# Patient Record
Sex: MALE | Race: WHITE | NOT HISPANIC OR LATINO | Employment: OTHER | URBAN - METROPOLITAN AREA
[De-identification: names, ages, dates, MRNs, and addresses within clinical notes are randomized per-mention and may not be internally consistent; named-entity substitution may affect disease eponyms.]

---

## 2019-11-11 ENCOUNTER — OFFICE VISIT (OUTPATIENT)
Dept: URGENT CARE | Facility: CLINIC | Age: 69
End: 2019-11-11
Payer: COMMERCIAL

## 2019-11-11 VITALS
WEIGHT: 165 LBS | HEART RATE: 68 BPM | BODY MASS INDEX: 22.35 KG/M2 | TEMPERATURE: 98.4 F | OXYGEN SATURATION: 99 % | RESPIRATION RATE: 20 BRPM | SYSTOLIC BLOOD PRESSURE: 136 MMHG | DIASTOLIC BLOOD PRESSURE: 75 MMHG | HEIGHT: 72 IN

## 2019-11-11 DIAGNOSIS — S01.81XA FACIAL LACERATION, INITIAL ENCOUNTER: Primary | ICD-10-CM

## 2019-11-11 PROCEDURE — 12011 RPR F/E/E/N/L/M 2.5 CM/<: CPT | Performed by: PHYSICIAN ASSISTANT

## 2019-11-11 PROCEDURE — 99213 OFFICE O/P EST LOW 20 MIN: CPT | Performed by: PHYSICIAN ASSISTANT

## 2019-11-11 PROCEDURE — S9083 URGENT CARE CENTER GLOBAL: HCPCS | Performed by: PHYSICIAN ASSISTANT

## 2019-11-11 RX ORDER — DIPHENOXYLATE HYDROCHLORIDE AND ATROPINE SULFATE 2.5; .025 MG/1; MG/1
1 TABLET ORAL DAILY
COMMUNITY

## 2019-11-11 RX ORDER — BACITRACIN, NEOMYCIN, POLYMYXIN B 400; 3.5; 5 [USP'U]/G; MG/G; [USP'U]/G
1 OINTMENT TOPICAL ONCE
Status: COMPLETED | OUTPATIENT
Start: 2019-11-11 | End: 2019-11-11

## 2019-11-11 RX ORDER — LIDOCAINE HYDROCHLORIDE 10 MG/ML
5 INJECTION, SOLUTION EPIDURAL; INFILTRATION; INTRACAUDAL; PERINEURAL ONCE
Status: COMPLETED | OUTPATIENT
Start: 2019-11-11 | End: 2019-11-11

## 2019-11-11 RX ADMIN — LIDOCAINE HYDROCHLORIDE 5 ML: 10 INJECTION, SOLUTION EPIDURAL; INFILTRATION; INTRACAUDAL; PERINEURAL at 10:52

## 2019-11-11 RX ADMIN — BACITRACIN, NEOMYCIN, POLYMYXIN B 1 SMALL APPLICATION: 400; 3.5; 5 OINTMENT TOPICAL at 11:23

## 2019-11-11 NOTE — PROGRESS NOTES
[facility]        NAME: Hope Mosher is a 68 y o  male  : 1950    MRN: [id number]  DATE: 2019  TIME: 11:45 AM    Assessment and Plan   Facial laceration, initial encounter Elis Beltrán  1  Facial laceration, initial encounter  lidocaine (PF) (XYLOCAINE-MPF) 1 % injection 5 mL    neomycin-bacitracin-polymyxin b (NEOSPORIN) ointment 1 small application     Laceration repair  Date/Time: 2019 11:43 AM  Performed by: Reymundo Baltazar PA-C  Authorized by: Reymundo Baltazar PA-C   Consent: Verbal consent obtained  Risks and benefits: risks, benefits and alternatives were discussed  Consent given by: patient  Patient understanding: patient states understanding of the procedure being performed  Patient identity confirmed: verbally with patient  Body area: head/neck  Location details: left eyebrow  Laceration length: 2 5 cm  Nerve involvement: none  Vascular damage: no  Anesthesia: local infiltration    Anesthesia:  Local Anesthetic: lidocaine 1% without epinephrine  Anesthetic total: 2 mL    Wound Dehiscence:  Superficial Wound Dehiscence: simple closure      Procedure Details:  Preparation: Patient was prepped and draped in the usual sterile fashion  Irrigation solution: saline  Irrigation method: syringe  Amount of cleaning: standard  Debridement: none  Skin closure: 5-0 nylon  Number of sutures: 6  Technique: simple  Approximation: close  Approximation difficulty: simple  Dressing: antibiotic ointment and 4x4 sterile gauze  Patient tolerance: Patient tolerated the procedure well with no immediate complications       Patient had a witnessed fall due to a missed step  Normal neurologic exam   Patient does not take blood thinners  No evidence of facial fracture on exam   Laceration repaired  Patient and wife instructed to go to emergency department for headache, dizziness, eye pain or vision change  Patient instructed to take Tylenol and apply ice pack today      Patient Instructions   Patient Instructions   Return in 5 - 7 days for suture removal   Keep covered for 24 hours then you may gently wash and pat dry  Apply antibiotic ointment 2 times a day  If you develop redness, swelling, drainage you need to be evaluated again for infection  If you develop headache, dizziness, excess sleepiness, eye pain or vision change go to ER for further evaluation         Proceed to  ER if symptoms worsen  Chief Complaint     Chief Complaint   Patient presents with    Head Laceration     Patient tripped over the curb, around 9:00AM  Per Patient - currently is not experiencing any signs of concussion         History of Present Illness       Patient is a 78-year-old male who presents for evaluation of laceration above the left eye which occurred around 9:00 a m  This morning  He states that he just did not lift his foot up high enough and tripped on the curb falling forward  There was no loss of consciousness  Patient is not on any blood thinning medications  He denies any headache, dizziness, vision change  He reports some mild discomfort in the area of the laceration  Review of Systems   Review of Systems   Constitutional: Negative  Eyes: Negative  Musculoskeletal: Negative  Skin: Positive for wound  Allergic/Immunologic: Negative  Neurological: Negative  Current Medications       Current Outpatient Medications:     multivitamin (THERAGRAN) TABS, Take 1 tablet by mouth daily, Disp: , Rfl:   No current facility-administered medications for this visit  Current Allergies     Allergies as of 11/11/2019    (No Known Allergies)            The following portions of the patient's history were reviewed and updated as appropriate: allergies, current medications, past family history, past medical history, past social history, past surgical history and problem list      History reviewed  No pertinent past medical history      Past Surgical History:   Procedure Laterality Date    APPENDECTOMY  1956    TONSILECTOMY AND ADNOIDECTOMY  1953    TOTAL SHOULDER REPLACEMENT Bilateral L - 2012; R - 2016       History reviewed  No pertinent family history  Medications have been verified  Objective   /75   Pulse 68   Temp 98 4 °F (36 9 °C) (Tympanic)   Resp 20   Ht 6' (1 829 m)   Wt 74 8 kg (165 lb)   SpO2 99%   BMI 22 38 kg/m²        Physical Exam     Physical Exam   Constitutional: He is oriented to person, place, and time  He appears well-developed  No distress  HENT:   Head: Head is with contusion and with laceration  Head is without raccoon's eyes  Nose: Nose normal    Eyes: Pupils are equal, round, and reactive to light  Conjunctivae and EOM are normal        2 5 cm laceration above left eye  Bleeding controlled  No Pain or double vision with EOM  Some ecchymosis noted over inferior lateral aspect of maxilla but no crepitus, deformity or significant pain  Neck: Normal range of motion and full passive range of motion without pain  No spinous process tenderness and no muscular tenderness present  Normal range of motion present  Neurological: He is alert and oriented to person, place, and time  No cranial nerve deficit or sensory deficit  Skin: Skin is warm  Ecchymosis and laceration noted  Vitals reviewed

## 2019-11-11 NOTE — PATIENT INSTRUCTIONS
Return in 5 - 7 days for suture removal   Keep covered for 24 hours then you may gently wash and pat dry  Apply antibiotic ointment 2 times a day  If you develop redness, swelling, drainage you need to be evaluated again for infection    If you develop headache, dizziness, excess sleepiness, eye pain or vision change go to ER for further evaluation

## 2019-11-16 ENCOUNTER — OFFICE VISIT (OUTPATIENT)
Dept: URGENT CARE | Facility: CLINIC | Age: 69
End: 2019-11-16
Payer: COMMERCIAL

## 2019-11-16 VITALS
SYSTOLIC BLOOD PRESSURE: 111 MMHG | TEMPERATURE: 97.7 F | RESPIRATION RATE: 16 BRPM | DIASTOLIC BLOOD PRESSURE: 58 MMHG | HEART RATE: 70 BPM | WEIGHT: 165 LBS | OXYGEN SATURATION: 98 % | HEIGHT: 72 IN | BODY MASS INDEX: 22.35 KG/M2

## 2019-11-16 DIAGNOSIS — Z48.02 ENCOUNTER FOR REMOVAL OF SUTURES: Primary | ICD-10-CM

## 2019-11-16 PROCEDURE — 99213 OFFICE O/P EST LOW 20 MIN: CPT | Performed by: PHYSICIAN ASSISTANT

## 2019-11-16 PROCEDURE — S9083 URGENT CARE CENTER GLOBAL: HCPCS | Performed by: PHYSICIAN ASSISTANT

## 2019-11-16 NOTE — PROGRESS NOTES
[facility]        NAME: Magui Barlow is a 68 y o  male  : 1950    MRN: [id number]  DATE: 2019  TIME: 10:51 AM    Assessment and Plan   Encounter for removal of sutures [Z48 02]  1  Encounter for removal of sutures        Sutures removed without complication  Patient Instructions     Patient Instructions   Be sure to wear sunscreen in that area  Proceed to  ER if symptoms worsen  Chief Complaint     Chief Complaint   Patient presents with    Suture / Staple Removal     Pt is here today for suture removal  Sutures placed to left eye region Monday  Pt denies discharge or complications  History of Present Illness       Pt presents for suture removal   Sutures were placed 6 days ago and a fall  He denies any pain, swelling, redness, headache, vision change or dizziness  Review of Systems   Review of Systems   Constitutional: Negative for chills and fever  Eyes: Negative  Skin: Positive for wound  Neurological: Negative for dizziness and headaches  Current Medications       Current Outpatient Medications:     multivitamin (THERAGRAN) TABS, Take 1 tablet by mouth daily, Disp: , Rfl:     Current Allergies     Allergies as of 2019    (No Known Allergies)            The following portions of the patient's history were reviewed and updated as appropriate: allergies, current medications, past family history, past medical history, past social history, past surgical history and problem list      History reviewed  No pertinent past medical history  Past Surgical History:   Procedure Laterality Date    ADENOIDECTOMY      APPENDECTOMY      TONSILECTOMY AND ADNOIDECTOMY      TOTAL SHOULDER REPLACEMENT Bilateral L - ; R -        History reviewed  No pertinent family history  Medications have been verified          Objective   /58 (BP Location: Left arm, Patient Position: Sitting)   Pulse 70   Temp 97 7 °F (36 5 °C) (Tympanic)   Resp 16   Ht 6' (1 829 m)   Wt 74 8 kg (165 lb)   SpO2 98%   BMI 22 38 kg/m²        Physical Exam     Physical Exam   Constitutional: No distress  Skin: Skin is warm  Sutures in place above left eye  No evidence of infection  Wound has approximated well  Vitals reviewed

## 2021-03-13 ENCOUNTER — IMMUNIZATIONS (OUTPATIENT)
Dept: FAMILY MEDICINE CLINIC | Facility: HOSPITAL | Age: 71
End: 2021-03-13

## 2021-03-13 DIAGNOSIS — Z23 ENCOUNTER FOR IMMUNIZATION: Primary | ICD-10-CM

## 2021-03-13 PROCEDURE — 91300 SARS-COV-2 / COVID-19 MRNA VACCINE (PFIZER-BIONTECH) 30 MCG: CPT

## 2021-03-13 PROCEDURE — 0001A SARS-COV-2 / COVID-19 MRNA VACCINE (PFIZER-BIONTECH) 30 MCG: CPT

## 2021-04-03 ENCOUNTER — IMMUNIZATIONS (OUTPATIENT)
Dept: FAMILY MEDICINE CLINIC | Facility: HOSPITAL | Age: 71
End: 2021-04-03

## 2021-04-03 DIAGNOSIS — Z23 ENCOUNTER FOR IMMUNIZATION: Primary | ICD-10-CM

## 2021-04-03 PROCEDURE — 0002A SARS-COV-2 / COVID-19 MRNA VACCINE (PFIZER-BIONTECH) 30 MCG: CPT

## 2021-04-03 PROCEDURE — 91300 SARS-COV-2 / COVID-19 MRNA VACCINE (PFIZER-BIONTECH) 30 MCG: CPT

## 2023-02-23 ENCOUNTER — OFFICE VISIT (OUTPATIENT)
Dept: PODIATRY | Facility: CLINIC | Age: 73
End: 2023-02-23

## 2023-02-23 VITALS
BODY MASS INDEX: 23.03 KG/M2 | WEIGHT: 170 LBS | DIASTOLIC BLOOD PRESSURE: 85 MMHG | HEART RATE: 66 BPM | HEIGHT: 72 IN | SYSTOLIC BLOOD PRESSURE: 134 MMHG

## 2023-02-23 DIAGNOSIS — B35.1 ONYCHOMYCOSIS: Primary | ICD-10-CM

## 2023-02-23 DIAGNOSIS — I73.9 PERIPHERAL VASCULAR DISEASE, UNSPECIFIED (HCC): ICD-10-CM

## 2023-02-23 DIAGNOSIS — L84 CORNS AND CALLUS: ICD-10-CM

## 2023-02-23 RX ORDER — TIMOLOL MALEATE 5 MG/ML
SOLUTION/ DROPS OPHTHALMIC EVERY 24 HOURS
COMMUNITY

## 2023-02-23 RX ORDER — METOPROLOL SUCCINATE 25 MG/1
TABLET, EXTENDED RELEASE ORAL
COMMUNITY
Start: 2023-01-24

## 2023-02-25 NOTE — PROGRESS NOTES
PATIENT:  Delmy Srivastava  1950    ASSESSMENT:  1  Onychomycosis        2  Corns and callus        3  Peripheral vascular disease, unspecified ([facility] [address] )              No orders of the defined types were placed in this encounter  PLAN:  The patient was educated in proper foot wear  Also discussed daily foot assessment and proper foot care  The patient will follow up in 9-12 weeks for foot exam and care  Procedures: 52165, 02188  All mycotic toenails were reduced and debrided in length, width, and girth using a nail nipper and dremel  All non-dystrphic nails also trimmed  All hyperkeratotic skin lesion(s) if present were sharply pared with a scalpel / forcep with no bleeding or evidence of ulceration  Patient tolerated procedure(s) well without complications  Procedures     HPI:  Delmy Srivastava is a 74 y  o year old male seen for at risk foot exam and care  The patient complained of thick toenails and painful lesions  The patient has class findings with PVD  The patient denied any acute pedal disorder, redness, acute swelling, or recent injury  The following portions of the patient's history were reviewed and updated as appropriate: allergies, current medications, past family history, past medical history, past social history, past surgical history and problem list     REVIEW OF SYSTEMS:  GENERAL: No fever or chills  HEART: No chest pain, or palpitation  RESPIRATORY:  No acute SOB or cough  GI: No Nausea, vomit or diarrhea  NEUROLOGIC: No syncope or acute weakness    PHYSICAL EXAM:    /85 (BP Location: Left arm, Patient Position: Sitting, Cuff Size: Standard)   Pulse 66   Ht 6' (1 829 m)   Wt 77 1 kg (170 lb)   BMI 23 06 kg/m²     VASCULAR EXAM  Posterior tibial artery  +1 bilateral     Dorsalis pedis artery absent bilateral   The patient has class findings with skin atrophy, lack of digital hair, and nail dystrophy  There is no lower extremity edema bilaterally  Venous stasis skin changes noted No BLE  NEUROLOGIC EXAM  Sensation is intact to light touch  Sensation is intact to 10gm monofilament  No focal neurologic deficit  DERMATOLOGIC EXAM:   No ulcer or cellulitis noted  Texture, Tone and Turgor are diminished? Yes  The patient has dystrophic/hypertrophic toenails with yellow/white discoloration, onycholysis, and subungal debris  Fungal odor and brittle nature noted  Pain with palpation  Periungual erythema noted  Right foot nails severely dystrophic x1 with 0 7 cm ave thickness (hallux)  Left foot nails dystrophic x5 with 0 4 cm ave thickness (1 through 5)  Patient has hyperkeratotic lesions noted:   Right foot at lateral hallux IPJ and medial second PIPJ within first interspace  Left foot at Sub #4 MPJ  No notable suspicious skin lesions  MUSCULOSKELETAL EXAM:   No acute joint pain, edema, or redness  No acute musculoskeletal problem  Patient has deformity including digital contractures  Risk Category/Class Findings: Q8 (B2 ABC, B3)    A1)  Has the patient had a previous amputation of the foot or integral skeletal portion thereof? No  B1)  Does the patient have absent posterior tibial pulse? No  B3)  Does the patient have absent dorsalis pedis? Yes  B2)  Does the patient have three of the following? Yes           1  Hair growth (increased or decreased), 2   Nail changes (thickening) and 3  Pigmentary changes (discoloring)  C)  Does the patient have two of the following and one above?  No

## 2023-06-02 ENCOUNTER — OFFICE VISIT (OUTPATIENT)
Dept: PODIATRY | Facility: CLINIC | Age: 73
End: 2023-06-02

## 2023-06-02 VITALS
WEIGHT: 167 LBS | BODY MASS INDEX: 22.62 KG/M2 | HEIGHT: 72 IN | HEART RATE: 64 BPM | DIASTOLIC BLOOD PRESSURE: 79 MMHG | SYSTOLIC BLOOD PRESSURE: 125 MMHG

## 2023-06-02 DIAGNOSIS — L84 CORNS AND CALLUS: ICD-10-CM

## 2023-06-02 DIAGNOSIS — B35.1 ONYCHOMYCOSIS: Primary | ICD-10-CM

## 2023-06-02 DIAGNOSIS — I73.9 PERIPHERAL VASCULAR DISEASE, UNSPECIFIED (HCC): ICD-10-CM

## 2023-06-02 NOTE — PROGRESS NOTES
PATIENT:  Thomas Grant  1950    ASSESSMENT:  1  Onychomycosis        2  Corns and callus        3  Peripheral vascular disease, unspecified ([facility] [address] )              No orders of the defined types were placed in this encounter  PLAN:  The patient was educated in proper foot wear  Also discussed daily foot assessment and proper foot care  The patient will follow up in 9-12 weeks for foot exam and care  Procedures: 19945, 79049  All mycotic toenails were reduced and debrided in length, width, and girth using a nail nipper and dremel  All non-dystrphic nails also trimmed  All hyperkeratotic skin lesion(s) if present were sharply pared with a scalpel / forcep with no bleeding or evidence of ulceration  Patient tolerated procedure(s) well without complications  Procedures     HPI:  Thomas Grant is a 74 y  o year old male seen for at risk foot exam and care  The patient complained of thick toenails and painful lesions  The patient has class findings with PVD  The patient denied any acute pedal disorder, redness, acute swelling, or recent injury  The following portions of the patient's history were reviewed and updated as appropriate: allergies, current medications, past family history, past medical history, past social history, past surgical history and problem list     REVIEW OF SYSTEMS:  GENERAL: No fever or chills  HEART: No chest pain, or palpitation  RESPIRATORY:  No acute SOB or cough  GI: No Nausea, vomit or diarrhea  NEUROLOGIC: No syncope or acute weakness    PHYSICAL EXAM:    /79   Pulse 64   Ht 6' (1 829 m)   Wt 75 8 kg (167 lb)   BMI 22 65 kg/m²     VASCULAR EXAM  Posterior tibial artery  +1 bilateral     Dorsalis pedis artery absent bilateral   The patient has class findings with skin atrophy, lack of digital hair, and nail dystrophy  There is no lower extremity edema bilaterally  Venous stasis skin changes noted No BLE      NEUROLOGIC EXAM  Sensation is intact to light touch  Sensation is intact to 10gm monofilament  No focal neurologic deficit  DERMATOLOGIC EXAM:   No ulcer or cellulitis noted  Texture, Tone and Turgor are diminished? Yes  The patient has dystrophic/hypertrophic toenails with yellow/white discoloration, onycholysis, and subungal debris  Fungal odor and brittle nature noted  Pain with palpation  Periungual erythema noted  Right foot nails severely dystrophic x1 with 0 7 cm ave thickness (hallux)  Left foot nails dystrophic x5 with 0 4 cm ave thickness (1 through 5)  Patient has hyperkeratotic lesions noted:   Right foot at lateral hallux IPJ and medial second PIPJ within first interspace  Left foot at Sub #4 MPJ  No notable suspicious skin lesions  MUSCULOSKELETAL EXAM:   No acute joint pain, edema, or redness  No acute musculoskeletal problem  Patient has deformity including digital contractures  Risk Category/Class Findings: Q8 (B2 ABC, B3)    A1)  Has the patient had a previous amputation of the foot or integral skeletal portion thereof? No  B1)  Does the patient have absent posterior tibial pulse? No  B3)  Does the patient have absent dorsalis pedis? Yes  B2)  Does the patient have three of the following? Yes           1  Hair growth (increased or decreased), 2   Nail changes (thickening) and 3  Pigmentary changes (discoloring)  C)  Does the patient have two of the following and one above?  No

## 2023-09-12 ENCOUNTER — OFFICE VISIT (OUTPATIENT)
Dept: PODIATRY | Facility: CLINIC | Age: 73
End: 2023-09-12
Payer: COMMERCIAL

## 2023-09-12 VITALS
DIASTOLIC BLOOD PRESSURE: 81 MMHG | BODY MASS INDEX: 21.67 KG/M2 | WEIGHT: 160 LBS | HEIGHT: 72 IN | HEART RATE: 73 BPM | SYSTOLIC BLOOD PRESSURE: 130 MMHG

## 2023-09-12 DIAGNOSIS — L84 CORNS AND CALLUS: ICD-10-CM

## 2023-09-12 DIAGNOSIS — B35.1 ONYCHOMYCOSIS: Primary | ICD-10-CM

## 2023-09-12 DIAGNOSIS — I73.9 PERIPHERAL VASCULAR DISEASE, UNSPECIFIED (HCC): ICD-10-CM

## 2023-09-12 PROCEDURE — 11056 PARNG/CUTG B9 HYPRKR LES 2-4: CPT | Performed by: PODIATRIST

## 2023-09-12 PROCEDURE — 11721 DEBRIDE NAIL 6 OR MORE: CPT | Performed by: PODIATRIST

## 2023-09-12 NOTE — PROGRESS NOTES
PATIENT:  Carolynn Tovar  1950    ASSESSMENT:  1. Onychomycosis        2. Corns and callus        3. Peripheral vascular disease, unspecified ([address])              No orders of the defined types were placed in this encounter. PLAN:  The patient was educated in proper foot wear. Also discussed daily foot assessment and proper foot care. The patient will follow up in 9-12 weeks for foot exam and care. Procedures: 84950, 46349  All mycotic toenails were reduced and debrided in length, width, and girth using a nail nipper and dremel. All non-dystrphic nails also trimmed. All hyperkeratotic skin lesion(s) if present were sharply pared with a scalpel / forcep with no bleeding or evidence of ulceration. Patient tolerated procedure(s) well without complications. Procedures     HPI:  Carolynn Tovar is a 74 y. o.year old male seen for at risk foot exam and care. The patient complained of thick toenails and painful lesions. The patient has class findings with PVD. Overall patient is clinically unchanged from prior visit. The patient denied any acute pedal disorder, redness, acute swelling, or recent injury. The following portions of the patient's history were reviewed and updated as appropriate: allergies, current medications, past family history, past medical history, past social history, past surgical history and problem list.    REVIEW OF SYSTEMS:  GENERAL: No fever or chills  HEART: No chest pain, or palpitation  RESPIRATORY:  No acute SOB or cough  GI: No Nausea, vomit or diarrhea  NEUROLOGIC: No syncope or acute weakness    PHYSICAL EXAM:    /81   Pulse 73   Ht 6' (1.829 m)   Wt 72.6 kg (160 lb)   BMI 21.70 kg/m²     VASCULAR EXAM  Posterior tibial artery  +1 bilateral.    Dorsalis pedis artery absent bilateral.  The patient has class findings with skin atrophy, lack of digital hair, and nail dystrophy. There is no lower extremity edema bilaterally.     Venous stasis skin changes noted No BLE. NEUROLOGIC EXAM  Sensation is intact to light touch. Sensation is intact to 10gm monofilament. No focal neurologic deficit. DERMATOLOGIC EXAM:   No ulcer or cellulitis noted. Texture, Tone and Turgor are diminished? Yes  The patient has dystrophic/hypertrophic toenails with yellow/white discoloration, onycholysis, and subungal debris. Fungal odor and brittle nature noted. Pain with palpation. Periungual erythema noted. Right foot nails severely dystrophic x1 with 0.7 cm ave thickness (hallux)  Left foot nails dystrophic x5 with 0.4 cm ave thickness (1 through 5)  Patient has hyperkeratotic lesions noted:   Right foot at lateral hallux IPJ and medial second PIPJ within first interspace. Left foot at Sub #4 MPJ. No notable suspicious skin lesions. MUSCULOSKELETAL EXAM:   No acute joint pain, edema, or redness. No acute musculoskeletal problem. Patient has deformity including digital contractures. Risk Category/Class Findings: Q8 (B2 ABC, B3)    A1)  Has the patient had a previous amputation of the foot or integral skeletal portion thereof? No  B1)  Does the patient have absent posterior tibial pulse? No  B3)  Does the patient have absent dorsalis pedis? Yes  B2)  Does the patient have three of the following? Yes           1. Hair growth (increased or decreased), 2.  Nail changes (thickening) and 3. Pigmentary changes (discoloring)  C)  Does the patient have two of the following and one above?  No

## 2023-11-09 ENCOUNTER — EVALUATION (OUTPATIENT)
Dept: OCCUPATIONAL THERAPY | Facility: CLINIC | Age: 73
End: 2023-11-09
Payer: COMMERCIAL

## 2023-11-09 DIAGNOSIS — R29.898 WEAKNESS OF LEFT UPPER EXTREMITY: Primary | ICD-10-CM

## 2023-11-09 PROCEDURE — 97165 OT EVAL LOW COMPLEX 30 MIN: CPT | Performed by: OCCUPATIONAL THERAPIST

## 2023-11-09 PROCEDURE — 97110 THERAPEUTIC EXERCISES: CPT | Performed by: OCCUPATIONAL THERAPIST

## 2023-11-09 PROCEDURE — 97112 NEUROMUSCULAR REEDUCATION: CPT | Performed by: OCCUPATIONAL THERAPIST

## 2023-11-09 NOTE — LETTER
2023    Dani Rosales DO  [address] [facility]    Patient: Morelia Peters   YOB: 1950   Date of Visit: 2023     Encounter Diagnosis     ICD-10-CM    1. Weakness of left upper extremity  R29.898           Dear Dr. Liliana Noel: Thank you for your recent referral of Morelia Peters. Please review the attached evaluation summary from Deion's recent visit. Please verify that you agree with the plan of care by signing the attached order. If you have any questions or concerns, please do not hesitate to call. I sincerely appreciate the opportunity to share in the care of one of your patients and hope to have another opportunity to work with you in the near future. Sincerely,    Kamran Solano, OT      Referring Provider:     I certify that I have read the below Plan of Care and certify the need for these services furnished under this plan of treatment while under my care. Dani Rosales DO  [address] [facility] [address]  Via Fax: [phone]        OT Evaluation     Today's date: 2023  Patient name: Morelia Peters  : 1950  MRN: [id number]  Referring provider: Dani Rosales DO  Dx:   Encounter Diagnosis     ICD-10-CM    1. Weakness of left upper extremity  R29.898                      Assessment  Assessment details: David Cristobal presents with L UE weakness since cervical laminectomy. . He has a history of B Shoulder replacements that have had restricted ROM since surgery. He has weakness for all shoulder motions . He has limited fine motor skill which may be from inablilty to stabilize  wrist and elbow . He has weak biceps and wrist ext. He Is unable to use his L for self care, eating , lifting , housework. He is seeing PT as well . He has help from his wife as needed.    Impairments: abnormal muscle firing, abnormal or restricted ROM, activity intolerance, impaired physical strength and lacks appropriate home exercise program  Functional limitations: unable to use L for self care, housework  Symptom irritability: moderateUnderstanding of Dx/Px/POC: good   Prognosis: good    Goals  STG - 1 wk  1-I with HEP  2- improve  5#    LTG- 8 wks  1- improve biceps to 5/5  2- improve  wrist ext to 5/5  3- I ADL - self care , meal prep , housework     Plan  Patient would benefit from: OT eval and skilled occupational therapy  Planned therapy interventions: home exercise program, strengthening, ADL retraining, kinesiology taping, graded activity and functional ROM exercises  Frequency: 1-2x wk. Duration in weeks: 8  Plan of Care beginning date: 2023  Treatment plan discussed with: patient        Subjective Evaluation    History of Present Illness  Date of surgery: 8/15/2023  Mechanism of injury: surgery  Mechanism of injury: Maxine Blackmon had cervical laminectomy on 8/15/23. 2 weeks post surgery he developed L UE weakness. He has had PT  post op. Quality of life: good    Patient Goals  Patient goals for therapy: increased motion, increased strength and independence with ADLs/IADLs  Patient goal: regain use L UE  Pain  Current pain ratin  At best pain ratin  At worst pain ratin  Location: L UE  Quality: dull ache  Exacerbated by: use of L UE.   Progression: improved    Social Support  Steps to enter house: yes  Stairs in house: yes (basemant)   Lives in: multiple-level home  Lives with: spouse and adult children    Employment status: not working  Hand dominance: right    Treatments  Previous treatment: physical therapy        Objective     Neurological Testing     Additional Neurological Details  C/o paresthesias B     Active Range of Motion   Left Shoulder   Flexion: 40 degrees   Abduction: 35 degrees   External rotation 0°: [facility]  Internal rotation 0°: 0 degrees     Left Elbow   Flexion: 60 degrees   Extension: 20 degrees   Forearm supination: WFL  Forearm pronation: WFL    Left Wrist   Normal active range of motion    Scapular Mobility     Additional Scapular Mobility Details  Able to shrug, retract , protract     Strength/Myotome Testing     Left Elbow   Flexion: 2-  Extension: 5    Left Wrist/Hand   Wrist extension: 4  Wrist flexion: 5  Radial deviation: 5  Ulnar deviation: 5     (2nd hand position)     Trial 1: 10    Right Wrist/Hand      (2nd hand position)     Trial 1: 48    Additional Strength Details  2/5 grossly  EPL - 5/5 , E I 4/5  , EDM 4/5     Unable to stabilize wrist during               Precautions:cervical laminectomy c3-6 8/15/23    [email or web address]  Access Code: [email or web address]        Manuals 11/9  IE                                                                HEP 3x day                                                                                                        Ther Ex             PROM L UE 5m            Wrist ext /rd 1#  3x10            Hammer sup Sm  3x10            Dowel shld flex 3x10            Shoulder flex 0 gravity 3x10            Elbow flex 0 gracity 3x10                         UBE 3/3            Ther Activity             Pegs  30                           Gait Training                                       Modalities

## 2023-11-09 NOTE — PROGRESS NOTES
OT Evaluation     Today's date: 2023  Patient name: Yola Fu  : 1950  MRN: [id number]  Referring provider: Shahla Denton DO  Dx:   Encounter Diagnosis     ICD-10-CM    1. Weakness of left upper extremity  R29.898                      Assessment  Assessment details: Chelsea Coronel presents with L UE weakness since cervical laminectomy. . He has a history of B Shoulder replacements that have had restricted ROM since surgery. He has weakness for all shoulder motions . He has limited fine motor skill which may be from inablilty to stabilize  wrist and elbow . He has weak biceps and wrist ext. He Is unable to use his L for self care, eating , lifting , housework. He is seeing PT as well . He has help from his wife as needed. Impairments: abnormal muscle firing, abnormal or restricted ROM, activity intolerance, impaired physical strength and lacks appropriate home exercise program  Functional limitations: unable to use L for self care, housework  Symptom irritability: moderateUnderstanding of Dx/Px/POC: good   Prognosis: good    Goals  STG - 1 wk  1-I with HEP  2- improve  5#    LTG- 8 wks  1- improve biceps to 5/5  2- improve  wrist ext to 5/5  3- I ADL - self care , meal prep , housework     Plan  Patient would benefit from: OT eval and skilled occupational therapy  Planned therapy interventions: home exercise program, strengthening, ADL retraining, kinesiology taping, graded activity and functional ROM exercises  Frequency: 1-2x wk. Duration in weeks: 8  Plan of Care beginning date: 2023  Treatment plan discussed with: patient        Subjective Evaluation    History of Present Illness  Date of surgery: 8/15/2023  Mechanism of injury: surgery  Mechanism of injury: Chelsea Coronel had cervical laminectomy on 8/15/23. 2 weeks post surgery he developed L UE weakness. He has had PT  post op.   Quality of life: good    Patient Goals  Patient goals for therapy: increased motion, increased strength and independence with ADLs/IADLs  Patient goal: regain use L UE  Pain  Current pain ratin  At best pain ratin  At worst pain ratin  Location: L UE  Quality: dull ache  Exacerbated by: use of L UE. Progression: improved    Social Support  Steps to enter house: yes  Stairs in house: yes (basemant)   Lives in: multiple-level home  Lives with: spouse and adult children    Employment status: not working  Hand dominance: right    Treatments  Previous treatment: physical therapy        Objective     Neurological Testing     Additional Neurological Details  C/o paresthesias B     Active Range of Motion   Left Shoulder   Flexion: 40 degrees   Abduction: 35 degrees   External rotation 0°: WFL  Internal rotation 0°: 0 degrees     Left Elbow   Flexion: 60 degrees   Extension: 20 degrees   Forearm supination: WFL  Forearm pronation: WFL    Left Wrist   Normal active range of motion    Scapular Mobility     Additional Scapular Mobility Details  Able to shrug, retract , protract     Strength/Myotome Testing     Left Elbow   Flexion: 2-  Extension: 5    Left Wrist/Hand   Wrist extension: 4  Wrist flexion: 5  Radial deviation: 5  Ulnar deviation: 5     (2nd hand position)     Trial 1: 10    Right Wrist/Hand      (2nd hand position)     Trial 1: 48    Additional Strength Details  2/5 grossly  EPL - 5/5 , E I 4/5  , EDM 4/5     Unable to stabilize wrist during               Precautions:cervical laminectomy c3-6 8/15/23    [email or web address]  Access Code: [email or web address]        Manuals   IE                                                                HEP 3x day                                                                                                        Ther Ex             PROM L UE 5m            Wrist ext /rd 1#  3x10            Hammer sup Sm  3x10            Dowel shld flex 3x10            Shoulder flex 0 gravity 3x10            Elbow flex 0 gracity 3x10                         UBE 3/3 Ther Activity             Pegs  30                           Gait Training                                       Modalities

## 2023-11-15 ENCOUNTER — OFFICE VISIT (OUTPATIENT)
Dept: OCCUPATIONAL THERAPY | Facility: CLINIC | Age: 73
End: 2023-11-15
Payer: COMMERCIAL

## 2023-11-15 DIAGNOSIS — R29.898 WEAKNESS OF LEFT UPPER EXTREMITY: Primary | ICD-10-CM

## 2023-11-15 PROCEDURE — 97110 THERAPEUTIC EXERCISES: CPT | Performed by: OCCUPATIONAL THERAPIST

## 2023-11-15 PROCEDURE — 97112 NEUROMUSCULAR REEDUCATION: CPT | Performed by: OCCUPATIONAL THERAPIST

## 2023-11-15 NOTE — PROGRESS NOTES
Daily Note     Today's date: 11/15/2023  Patient name: Wilmar Robles  : 1950  MRN: [id number]  Referring provider: Georgiana Rico DO  Dx:   Encounter Diagnosis     ICD-10-CM    1. Weakness of left upper extremity  R29.898                      Subjective: I have been trying to the exercises at home      Objective: See treatment diary below      Assessment: Tolerated treatment well. Patient  is compliant with HEP carryover and motivated with tx. Plan: Continue per plan of care. Precautions:cervical laminectomy c3-6 8/15/23    [email or web address]  Access Code: [email or web address]        Manuals   IE 11/15                                                               HEP 3x day                                                                                                        Ther Ex             PROM L UE 5m 5m           Wrist ext /rd 1#  3x10 #1 3x110           Hammer sup Sm  3x10 SM 3x10           Dowel shld flex 3x10 Bar  3  x10           Shoulder flex 0 gravity 3x10 3x10           Elbow flex 0 gracity 3x10 #1 cuff 3x10           gripping  RPW 2x30           UBE 3/3 3/3           Ther Activity             Pegs  30                           Gait Training                                       Modalities

## 2023-11-21 ENCOUNTER — OFFICE VISIT (OUTPATIENT)
Dept: OCCUPATIONAL THERAPY | Facility: CLINIC | Age: 73
End: 2023-11-21
Payer: COMMERCIAL

## 2023-11-21 ENCOUNTER — OFFICE VISIT (OUTPATIENT)
Dept: PODIATRY | Facility: CLINIC | Age: 73
End: 2023-11-21
Payer: COMMERCIAL

## 2023-11-21 VITALS
SYSTOLIC BLOOD PRESSURE: 139 MMHG | HEART RATE: 52 BPM | BODY MASS INDEX: 21.67 KG/M2 | DIASTOLIC BLOOD PRESSURE: 80 MMHG | WEIGHT: 160 LBS | HEIGHT: 72 IN

## 2023-11-21 DIAGNOSIS — L84 CORNS AND CALLUS: ICD-10-CM

## 2023-11-21 DIAGNOSIS — B35.1 ONYCHOMYCOSIS: Primary | ICD-10-CM

## 2023-11-21 DIAGNOSIS — R29.898 WEAKNESS OF LEFT UPPER EXTREMITY: Primary | ICD-10-CM

## 2023-11-21 DIAGNOSIS — I73.9 PERIPHERAL VASCULAR DISEASE, UNSPECIFIED (HCC): ICD-10-CM

## 2023-11-21 PROCEDURE — 11056 PARNG/CUTG B9 HYPRKR LES 2-4: CPT | Performed by: PODIATRIST

## 2023-11-21 PROCEDURE — 11721 DEBRIDE NAIL 6 OR MORE: CPT | Performed by: PODIATRIST

## 2023-11-21 PROCEDURE — 97110 THERAPEUTIC EXERCISES: CPT | Performed by: OCCUPATIONAL THERAPIST

## 2023-11-21 PROCEDURE — 97112 NEUROMUSCULAR REEDUCATION: CPT | Performed by: OCCUPATIONAL THERAPIST

## 2023-11-21 NOTE — PROGRESS NOTES
Daily Note     Today's date: 2023  Patient name: Sandoval Camarillo  : 1950  MRN: [id number]  Referring provider: Yany Nogueira DO  Dx:   Encounter Diagnosis     ICD-10-CM    1. Weakness of left upper extremity  R29.898                      Subjective: I do my exercises      Objective: See treatment diary below      Assessment: Tolerated treatment well. Patient  had fatigue post tx       Plan: Continue per plan of care. Precautions:cervical laminectomy c3-6 8/15/23    [email or web address]  Access Code: [email or web address]        Manuals   IE 11/15 11/21                                                              HEP 3x day                                                                                                        Ther Ex             PROM L UE 5m 5m 2m          Wrist ext /rd 1#  3x10 #1 3x110 2#  3x10          Hammer sup Sm  3x10 SM 3x10 Sm  3x10          Dowel shld flex 3x10 Bar  3  x10           Shoulder flex 0 gravity 3x10 3x10 3x10          Elbow flex 0 gracity 3x10 #1 cuff 3x10 3x10          gripping  RPW 2x30 RPW  2x30          UBE 3/3   5/5          Ther Activity             Pegs  30    30x          Stress load    2m          Webslide row   Green  3x10                                    Modalities

## 2023-11-28 ENCOUNTER — OFFICE VISIT (OUTPATIENT)
Dept: OCCUPATIONAL THERAPY | Facility: CLINIC | Age: 73
End: 2023-11-28
Payer: COMMERCIAL

## 2023-11-28 DIAGNOSIS — R29.898 WEAKNESS OF LEFT UPPER EXTREMITY: Primary | ICD-10-CM

## 2023-11-28 PROCEDURE — 97110 THERAPEUTIC EXERCISES: CPT | Performed by: OCCUPATIONAL THERAPIST

## 2023-11-28 PROCEDURE — 97112 NEUROMUSCULAR REEDUCATION: CPT | Performed by: OCCUPATIONAL THERAPIST

## 2023-12-01 ENCOUNTER — OFFICE VISIT (OUTPATIENT)
Dept: OCCUPATIONAL THERAPY | Facility: CLINIC | Age: 73
End: 2023-12-01
Payer: COMMERCIAL

## 2023-12-01 DIAGNOSIS — R29.898 WEAKNESS OF LEFT UPPER EXTREMITY: Primary | ICD-10-CM

## 2023-12-01 PROCEDURE — 97110 THERAPEUTIC EXERCISES: CPT | Performed by: OCCUPATIONAL THERAPIST

## 2023-12-01 PROCEDURE — 97112 NEUROMUSCULAR REEDUCATION: CPT | Performed by: OCCUPATIONAL THERAPIST

## 2023-12-01 NOTE — PROGRESS NOTES
Daily Note     Today's date: 2023  Patient name: Otoniel Shen  : 1950  MRN: [id number]  Referring provider: Shira Newell DO  Dx:   Encounter Diagnosis     ICD-10-CM    1. Weakness of left upper extremity  R29.898                      Subjective: I am doing ok       Objective: See treatment diary below      Assessment: Tolerated treatment fair. Patient  has continued weakness that limits use of  L for ADL       Plan: Continue per plan of care. Precautions:cervical laminectomy c3-6 8/15/23    [email or web address]  Access Code: [facility]        Manuals   IE 11/15 11/21 11/28 11/30                                                            HEP 3x day   Tband                                                                                                     Ther Ex             PROM L UE 5m 5m 2m          Wrist ext /rd 1#  3x10 #1 3x110 2#  3x10 2#  3x10 2#  3x10        Hammer sup Sm  3x10 SM 3x10 Sm  3x10 Sm  3x10 Sm  3x10        Dowel shld flex 3x10 Bar  3  x10           Shoulder flex 0 gravity 3x10 3x10 3x10 3x10 3x10        Elbow flex 0 gracity 3x10 #1 cuff 3x10 3x10 3x10 3x10        gripping  RPW 2x30 RPW  2x30 RPW  2x30 GPW  3x10        UBE 3/3   5/5 3/3 3/3         Ther Activity             Pegs  30    30x          Stress load    2m 2m 2m        Webslide row   Green  3x10 Black  3x10 Black  3x10        Webslide elbow ext shl ext    Black  3x10 Black  3x10                     Modalities

## 2023-12-04 ENCOUNTER — OFFICE VISIT (OUTPATIENT)
Dept: OCCUPATIONAL THERAPY | Facility: CLINIC | Age: 73
End: 2023-12-04
Payer: COMMERCIAL

## 2023-12-04 DIAGNOSIS — R29.898 WEAKNESS OF LEFT UPPER EXTREMITY: Primary | ICD-10-CM

## 2023-12-04 PROCEDURE — 97112 NEUROMUSCULAR REEDUCATION: CPT | Performed by: OCCUPATIONAL THERAPIST

## 2023-12-04 PROCEDURE — 97110 THERAPEUTIC EXERCISES: CPT | Performed by: OCCUPATIONAL THERAPIST

## 2023-12-04 NOTE — PROGRESS NOTES
Daily Note     Today's date: 2023  Patient name: Yola Fu  : 1950  MRN: [id number]  Referring provider: Shahla Denton DO  Dx:   Encounter Diagnosis     ICD-10-CM    1. Weakness of left upper extremity  R29.898                      Subjective: no change       Objective: See treatment diary below      Assessment: Tolerated treatment fair. Patient  remains limited with use of L for function. Plan: Continue per plan of care. Precautions:cervical laminectomy c3-6 8/15/23    [email or web address]  Access Code: [facility]        Manuals   IE 11/15 11/21 11/28 11/30 11/4                                                           HEP 3x day   Tband                                                                                                     Ther Ex             PROM L UE 5m 5m 2m          Wrist ext /rd 1#  3x10 #1 3x110 2#  3x10 2#  3x10 2#  3x10 2#  3x10       Hammer sup Sm  3x10 SM 3x10 Sm  3x10 Sm  3x10 Sm  3x10 Sm  3x10       Dowel shld flex 3x10 Bar  3  x10           Shoulder flex 0 gravity 3x10 3x10 3x10 3x10 3x10 3x10       Elbow flex 0 gracity 3x10 #1 cuff 3x10 3x10 3x10 3x10 3x10       gripping  RPW 2x30 RPW  2x30 RPW  2x30 GPW  3x10 GPW  3x10       UBE 3/3   5/5 3/3 3/3  3/3       Ther Activity             Pegs  30    30x          Stress load    2m 2m 2m 1m       Webslide row   Green  3x10 Black  3x10 Black  3x10 Black  3x10       Webslide elbow ext shl ext    Black  3x10 Black  3x10 Black  3x10                    Modalities

## 2023-12-06 ENCOUNTER — OFFICE VISIT (OUTPATIENT)
Dept: OCCUPATIONAL THERAPY | Facility: CLINIC | Age: 73
End: 2023-12-06
Payer: COMMERCIAL

## 2023-12-06 DIAGNOSIS — R29.898 WEAKNESS OF LEFT UPPER EXTREMITY: Primary | ICD-10-CM

## 2023-12-06 PROCEDURE — 97110 THERAPEUTIC EXERCISES: CPT | Performed by: OCCUPATIONAL THERAPIST

## 2023-12-06 PROCEDURE — 97112 NEUROMUSCULAR REEDUCATION: CPT | Performed by: OCCUPATIONAL THERAPIST

## 2023-12-06 NOTE — PROGRESS NOTES
Daily Note     Today's date: 2023  Patient name: Morelia Peters  : 1950  MRN: [id number]  Referring provider: Dani Rosales DO  Dx:   Encounter Diagnosis     ICD-10-CM    1. Weakness of left upper extremity  R29.898                      Subjective: I am doing better      Objective: See treatment diary below      Assessment: Tolerated treatment well. Patient  has improved biceps function . He has good contraction. He was able to bring hand to hand to mouth 1 x      Plan: Continue per plan of care. Precautions:cervical laminectomy c3-6 8/15/23    [email or web address]  Access Code: [facility]        Manuals   IE 11/15 11/21 11/28 11/30 11/4 12/6                                                          HEP 3x day   Tband                                                                                                     Ther Ex             PROM L UE 5m 5m 2m          Wrist ext /rd 1#  3x10 #1 3x110 2#  3x10 2#  3x10 2#  3x10 2#  3x10 2#  3x10      Hammer sup Sm  3x10 SM 3x10 Sm  3x10 Sm  3x10 Sm  3x10 Sm  3x10 Sm  3x10      Dowel shld flex 3x10 Bar  3  x10           Shoulder flex 0 gravity 3x10 3x10 3x10 3x10 3x10 3x10 3x10      Elbow flex 0 graviy 3x10 #1 cuff 3x10 3x10 3x10 3x10 3x10 3x10      gripping  RPW 2x30 RPW  2x30 RPW  2x30 GPW  3x10 GPW  3x10 GPW  3x10      UBE 3/3   5/5 3/3 3/3  3/3 3/3      Ther Activity             Pegs  30    30x          Stress load    2m 2m 2m 1m 1m      Webslide row   Green  3x10 Black  3x10 Black  3x10 Black  3x10 Black  3x10      Webslide elbow ext shl ext    Black  3x10 Black  3x10 Black  3x10 Black  3x10                   Modalities

## 2023-12-11 ENCOUNTER — OFFICE VISIT (OUTPATIENT)
Dept: OCCUPATIONAL THERAPY | Facility: CLINIC | Age: 73
End: 2023-12-11
Payer: COMMERCIAL

## 2023-12-11 DIAGNOSIS — R29.898 WEAKNESS OF LEFT UPPER EXTREMITY: Primary | ICD-10-CM

## 2023-12-11 PROCEDURE — 97110 THERAPEUTIC EXERCISES: CPT | Performed by: OCCUPATIONAL THERAPIST

## 2023-12-11 PROCEDURE — 97112 NEUROMUSCULAR REEDUCATION: CPT | Performed by: OCCUPATIONAL THERAPIST

## 2023-12-11 NOTE — PROGRESS NOTES
Daily Note     Today's date: 2023  Patient name: Reginaldo Munoz  : 1950  MRN: [id number]  Referring provider: Kenneth Skaggs DO  Dx:   Encounter Diagnosis     ICD-10-CM    1. Weakness of left upper extremity  R29.898                      Subjective: I am doing ok       Objective: See treatment diary below      Assessment: Tolerated treatment well. Patient  shows improved biceps function      Plan: Continue per plan of care. Precautions:cervical laminectomy c3-6 8/15/23    [email or web address]  Access Code: [facility]        Manuals   IE 11/15 11/21 11/28 11/30 11/4 12/6 12/11                                                         HEP 3x day   Tband                                                                                                     Ther Ex             PROM L UE 5m 5m 2m          Wrist ext /rd 1#  3x10 #1 3x110 2#  3x10 2#  3x10 2#  3x10 2#  3x10 2#  3x10 2#  3x10     Hammer sup Sm  3x10 SM 3x10 Sm  3x10 Sm  3x10 Sm  3x10 Sm  3x10 Sm  3x10       Dowel shld flex 3x10 Bar  3  x10           Shoulder flex 0 gravity 3x10 3x10 3x10 3x10 3x10 3x10 3x10 3x10     Elbow flex 0 graviy 3x10 #1 cuff 3x10 3x10 3x10 3x10 3x10 3x10 3x10     gripping  RPW 2x30 RPW  2x30 RPW  2x30 GPW  3x10 GPW  3x10 GPW  3x10 GPW  3x10     UBE 3/3   5/5 3/3 3/3  3/3 3/3 3/3     Ther Activity             Pegs  30    30x     30x     Stress load    2m 2m 2m 1m 1m      Webslide row   Green  3x10 Black  3x10 Black  3x10 Black  3x10 Black  3x10 Black  3x10     Webslide elbow ext shl ext    Black  3x10 Black  3x10 Black  3x10 Black  3x10 Black  3x10     Flexbar P/S        Yellow  3x10     Modalities

## 2023-12-15 ENCOUNTER — OFFICE VISIT (OUTPATIENT)
Dept: OCCUPATIONAL THERAPY | Facility: CLINIC | Age: 73
End: 2023-12-15
Payer: COMMERCIAL

## 2023-12-15 DIAGNOSIS — R29.898 WEAKNESS OF LEFT UPPER EXTREMITY: Primary | ICD-10-CM

## 2023-12-15 PROCEDURE — 97112 NEUROMUSCULAR REEDUCATION: CPT | Performed by: OCCUPATIONAL THERAPIST

## 2023-12-15 PROCEDURE — 97110 THERAPEUTIC EXERCISES: CPT | Performed by: OCCUPATIONAL THERAPIST

## 2023-12-15 NOTE — PROGRESS NOTES
Daily Note     Today's date: 12/15/2023  Patient name: Pamela Lorenzo  : 1950  MRN: [id number]  Referring provider: Ted Raphael DO  Dx:   Encounter Diagnosis     ICD-10-CM    1. Weakness of left upper extremity  R29.898                      Subjective: I do my exercises       Objective: See treatment diary below      Assessment:            Assessment  Assessment details: West Andrade presents with L UE weakness since cervical laminectomy. . He has a history of B Shoulder replacements that have had restricted ROM since surgery. He has weakness for all shoulder motions . He has limited fine motor skill which may be from inablilty to stabilize  wrist and elbow . He has weak biceps and wrist ext. He Is unable to use his L for self care, eating , lifting , housework. He is seeing PT as well . He has help from his wife as needed. Impairments: abnormal muscle firing, abnormal or restricted ROM, activity intolerance, impaired physical strength and lacks appropriate home exercise program    12/15/23- West Andrade has improved biceps strength , he has full ROM in zero gravity . He has been compliant with HEP and tx. He still does not have use of L for ADL . Functional limitations: unable to use L for self care, housework  Symptom irritability: moderateUnderstanding of Dx/Px/POC: good   Prognosis: good    Goals  STG - 1 wk  1-I with HEP- met   2- improve  5#- met     LTG- 8 wks  1- improve biceps to 5/5  2- improve  wrist ext to 5/5  3- I ADL - self care , meal prep , housework     Plan  Patient would benefit from:  skilled occupational therapy  Planned therapy interventions: home exercise program, strengthening, ADL retraining, kinesiology taping, graded activity and functional ROM exercises  Frequency: 1-2x wk.   Duration in weeks: 8  Plan of Care beginning date: 2023  Treatment plan discussed with: patient        Subjective Evaluation    History of Present Illness  Date of surgery: 8/15/2023  Mechanism of injury: surgery  Mechanism of injury: Willie had cervical laminectomy on 8/15/23. 2 weeks post surgery he developed L UE weakness. He has had PT  post op. Quality of life: good    Patient Goals  Patient goals for therapy: increased motion, increased strength and independence with ADLs/IADLs  Patient goal: regain use L UE  Pain  Current pain ratin  At best pain ratin  At worst pain ratin  Location: L UE  Quality: dull ache  Exacerbated by: use of L UE. Progression: improved    Social Support  Steps to enter house: yes  Stairs in house: yes (basemant)   Lives in: multiple-level home  Lives with: spouse and adult children    Employment status: not working  Hand dominance: right    Treatments  Previous treatment: physical therapy        Objective     Neurological Testing     Additional Neurological Details  C/o paresthesias B     Active Range of Motion   Left Shoulder   Flexion: 40 degrees   Abduction: 35 degrees   External rotation 0°: WFL  Internal rotation 0°: 0 degrees     Left Elbow   Flexion: 60 degrees   Extension: 20 degrees   Forearm supination: WFL  Forearm pronation: WFL    Left Wrist   Normal active range of motion    Scapular Mobility     Additional Scapular Mobility Details  Able to shrug, retract , protract     Strength/Myotome Testing     Left Elbow   Flexion:3  Extension: 5    Left Wrist/Hand   Wrist extension: 4  Wrist flexion: 5  Radial deviation: 5  Ulnar deviation: 5     (2nd hand position)     Trial 1: 15 previous 10- unable to stabilize wrist when gripping - loss of power    Right Wrist/Hand      (2nd hand position)     Trial 1: 48    Additional Strength Details  2/5 grossly  EPL - 5/5 , E I 5/5  , EDM 55                  Plan: Continue per plan of care. Precautions:cervical laminectomy c3-6 8/15/23    [email or web address]  Access Code: [facility]        Manuals   IE 11/15 11/21 11/28 11/30 11/4 12/6 12/11 12/15 HEP 3x day   Tband                                                                                                     Ther Ex             PROM L UE 5m 5m 2m          Wrist ext /rd 1#  3x10 #1 3x110 2#  3x10 2#  3x10 2#  3x10 2#  3x10 2#  3x10 2#  3x10     Hammer sup Sm  3x10 SM 3x10 Sm  3x10 Sm  3x10 Sm  3x10 Sm  3x10 Sm  3x10        Flexbar twist /P/S          Red  3x10    Shoulder flex 0 gravity 3x10 3x10 3x10 3x10 3x10 3x10 3x10 3x10 3x10    Elbow flex 0 graviy 3x10 #1 cuff 3x10 3x10 3x10 3x10 3x10 3x10 3x10     gripping  RPW 2x30 RPW  2x30 RPW  2x30 GPW  3x10 GPW  3x10 GPW  3x10 GPW  3x10 BPW 3x10    UBE 3/3   5/5 3/3 3/3  3/3 3/3 3/3 3/3    Ther Activity             Pegs  30    30x     30x     Stress load    2m 2m 2m 1m 1m      Webslide row   Green  3x10 Black  3x10 Black  3x10 Black  3x10 Black  3x10 Black  3x10 Black  3x10    Webslide elbow ext shl ext    Black  3x10 Black  3x10 Black  3x10 Black  3x10 Black  3x10 Black  3x10    Flexbar P/S        Yellow  3x10     Chest press   supine         2#  Bar  3x10    Shld flex   supine         2#  Bar  3x10    Elbow flex in sidelie  ) gravity         3x10

## 2023-12-15 NOTE — LETTER
December 15, 2023    Rock Milady DO  [address] [facility]    Patient: Angelika Kent   YOB: 1950   Date of Visit: 12/15/2023     Encounter Diagnosis     ICD-10-CM    1. Weakness of left upper extremity  R29.898           Dear Dr. Mulugeta Quiroz: Thank you for your recent referral of Angelika Kent. Please review the attached evaluation summary from Deion's recent visit. Please verify that you agree with the plan of care by signing the attached order. If you have any questions or concerns, please do not hesitate to call. I sincerely appreciate the opportunity to share in the care of one of your patients and hope to have another opportunity to work with you in the near future. Sincerely,    Chaka Mary, OT      Referring Provider:     I certify that I have read the below Plan of Care and certify the need for these services furnished under this plan of treatment while under my care. Rock Milady DO  [address] [facility]  Via Fax: [phone]        Daily Note     Today's date: 12/15/2023  Patient name: Angelika Kent  : 1950  MRN: [id number]  Referring provider: Rock Milady DO  Dx:   Encounter Diagnosis     ICD-10-CM    1. Weakness of left upper extremity  R29.898                      Subjective: I do my exercises       Objective: See treatment diary below      Assessment:            Assessment  Assessment details: Joshua Jon presents with L UE weakness since cervical laminectomy. . He has a history of B Shoulder replacements that have had restricted ROM since surgery. He has weakness for all shoulder motions . He has limited fine motor skill which may be from inablilty to stabilize  wrist and elbow . He has weak biceps and wrist ext. He Is unable to use his L for self care, eating , lifting , housework. He is seeing PT as well . He has help from his wife as needed.    Impairments: abnormal muscle firing, abnormal or restricted ROM, activity intolerance, impaired physical strength and lacks appropriate home exercise program    12/15/23- Any Haskins has improved biceps strength , he has full ROM in zero gravity . He has been compliant with HEP and tx. He still does not have use of L for ADL . Functional limitations: unable to use L for self care, housework  Symptom irritability: moderateUnderstanding of Dx/Px/POC: good   Prognosis: good    Goals  STG - 1 wk  1-I with HEP- met   2- improve  5#- met     LTG- 8 wks  1- improve biceps to 5/5  2- improve  wrist ext to 5/5  3- I ADL - self care , meal prep , housework     Plan  Patient would benefit from:  skilled occupational therapy  Planned therapy interventions: home exercise program, strengthening, ADL retraining, kinesiology taping, graded activity and functional ROM exercises  Frequency: 1-2x wk. Duration in weeks: 8  Plan of Care beginning date: 2023  Treatment plan discussed with: patient        Subjective Evaluation    History of Present Illness  Date of surgery: 8/15/2023  Mechanism of injury: surgery  Mechanism of injury: Any Haskins had cervical laminectomy on 8/15/23. 2 weeks post surgery he developed L UE weakness. He has had PT  post op. Quality of life: good    Patient Goals  Patient goals for therapy: increased motion, increased strength and independence with ADLs/IADLs  Patient goal: regain use L UE  Pain  Current pain ratin  At best pain ratin  At worst pain ratin  Location: L UE  Quality: dull ache  Exacerbated by: use of L UE.   Progression: improved    Social Support  Steps to enter house: yes  Stairs in house: yes (basemant)   Lives in: multiple-level home  Lives with: spouse and adult children    Employment status: not working  Hand dominance: right    Treatments  Previous treatment: physical therapy        Objective     Neurological Testing     Additional Neurological Details  C/o paresthesias B     Active Range of Motion   Left Shoulder   Flexion: 40 degrees   Abduction: 35 degrees   External rotation 0°: [facility]  Internal rotation 0°: 0 degrees     Left Elbow   Flexion: 60 degrees   Extension: 20 degrees   Forearm supination: WFL  Forearm pronation: WFL    Left Wrist   Normal active range of motion    Scapular Mobility     Additional Scapular Mobility Details  Able to shrug, retract , protract     Strength/Myotome Testing     Left Elbow   Flexion:3  Extension: 5    Left Wrist/Hand   Wrist extension: 4  Wrist flexion: 5  Radial deviation: 5  Ulnar deviation: 5     (2nd hand position)     Trial 1: 15 previous 10- unable to stabilize wrist when gripping - loss of power    Right Wrist/Hand      (2nd hand position)     Trial 1: 48    Additional Strength Details  2/5 grossly  EPL - 5/5 , E I 5/5  , EDM 55                  Plan: Continue per plan of care. Precautions:cervical laminectomy c3-6 8/15/23    [email or web address]  Access Code: [facility]        Manuals 11/9  IE 11/15 11/21 11/28 11/30 11/4 12/6 12/11 12/15                                                        HEP 3x day   Tband                                                                                                     Ther Ex             PROM L UE 5m 5m 2m          Wrist ext /rd 1#  3x10 #1 3x110 2#  3x10 2#  3x10 2#  3x10 2#  3x10 2#  3x10 2#  3x10     Hammer sup Sm  3x10 SM 3x10 Sm  3x10 Sm  3x10 Sm  3x10 Sm  3x10 Sm  3x10        Flexbar twist /P/S          Red  3x10    Shoulder flex 0 gravity 3x10 3x10 3x10 3x10 3x10 3x10 3x10 3x10 3x10    Elbow flex 0 graviy 3x10 #1 cuff 3x10 3x10 3x10 3x10 3x10 3x10 3x10     gripping  RPW 2x30 RPW  2x30 RPW  2x30 GPW  3x10 GPW  3x10 GPW  3x10 GPW  3x10 BPW 3x10    UBE 3/3   5/5 3/3 3/3  3/3 3/3 3/3 3/3    Ther Activity             Pegs  30    30x     30x     Stress load    2m 2m 2m 1m 1m      Webslide row   Green  3x10 Black  3x10 Black  3x10 Black  3x10 Black  3x10 Black  3x10 Black  3x10    Webslide elbow ext shl ext Black  3x10 Black  3x10 Black  3x10 Black  3x10 Black  3x10 Black  3x10    Flexbar P/S        Yellow  3x10     Chest press   supine         2#  Bar  3x10    Shld flex   supine         2#  Bar  3x10    Elbow flex in sidelie  ) gravity         3x10

## 2023-12-21 ENCOUNTER — OFFICE VISIT (OUTPATIENT)
Dept: OCCUPATIONAL THERAPY | Facility: CLINIC | Age: 73
End: 2023-12-21
Payer: COMMERCIAL

## 2023-12-21 DIAGNOSIS — R29.898 WEAKNESS OF LEFT UPPER EXTREMITY: Primary | ICD-10-CM

## 2023-12-21 PROCEDURE — 97110 THERAPEUTIC EXERCISES: CPT | Performed by: OCCUPATIONAL THERAPIST

## 2023-12-21 PROCEDURE — 97112 NEUROMUSCULAR REEDUCATION: CPT | Performed by: OCCUPATIONAL THERAPIST

## 2023-12-21 NOTE — PROGRESS NOTES
Daily Note     Today's date: 2023  Patient name: Deion Wu  : 1950  MRN: [id number]  Referring provider: Colton Lees DO  Dx:   Encounter Diagnosis     ICD-10-CM    1. Weakness of left upper extremity  R29.898                      Subjective: I do the exercises      Objective: See treatment diary below      Assessment: Tolerated treatment fair. Patient  has improved elbow and shoulder flexion in 0 gravity . He was able to bring hand to face against 2x.      Plan: Continue per plan of care.      Precautions:cervical laminectomy c3-6 8/15/23    [email or web address]  Access Code: [email or web address]        Manuals   IE 11/15 11/21 11/28 11/30 11/4 12/6 12/11 12/15  RE                                                        HEP 3x day   Tband                                                                                                     Ther Ex             PROM L UE 5m 5m 2m          Wrist ext /rd 1#  3x10 #1 3x110 2#  3x10 2#  3x10 2#  3x10 2#  3x10 2#  3x10 2#  3x10  2#  3x10   Hammer sup Sm  3x10 SM 3x10 Sm  3x10 Sm  3x10 Sm  3x10 Sm  3x10 Sm  3x10        Flexbar twist /P/S          Red  3x10 Red  3x10   Shoulder flex 0 gravity 3x10 3x10 3x10 3x10 3x10 3x10 3x10 3x10 3x10 3x10   Elbow flex 0 graviy 3x10 #1 cuff 3x10 3x10 3x10 3x10 3x10 3x10 3x10     gripping  RPW 2x30 RPW  2x30 RPW  2x30 GPW  3x10 GPW  3x10 GPW  3x10 GPW  3x10 BPW 3x10 BPW  3x10   UBE 3/3   5/5 3/3 3/3  3/3 3/3 3/3 3/3 3/3   Ther Activity             Pegs  30    30x     30x     Stress load    2m 2m 2m 1m 1m      Webslide row   Green  3x10 Black  3x10 Black  3x10 Black  3x10 Black  3x10 Black  3x10 Black  3x10 Black  3x10   Webslide elbow ext shl ext    Black  3x10 Black  3x10 Black  3x10 Black  3x10 Black  3x10 Black  3x10 Black  3x10   Flexbar P/S        Yellow  3x10     Chest press   supine         2# 3#  3x10   Shld flex   supine         2#  Bar  3x10 3#  bar  3x10   Elbow flex in sidelie  ) gravity          3x10 3x10

## 2023-12-28 ENCOUNTER — OFFICE VISIT (OUTPATIENT)
Dept: OCCUPATIONAL THERAPY | Facility: CLINIC | Age: 73
End: 2023-12-28
Payer: COMMERCIAL

## 2023-12-28 DIAGNOSIS — R29.898 WEAKNESS OF LEFT UPPER EXTREMITY: Primary | ICD-10-CM

## 2023-12-28 PROCEDURE — 97110 THERAPEUTIC EXERCISES: CPT | Performed by: OCCUPATIONAL THERAPIST

## 2023-12-28 PROCEDURE — 97112 NEUROMUSCULAR REEDUCATION: CPT | Performed by: OCCUPATIONAL THERAPIST

## 2023-12-28 NOTE — PROGRESS NOTES
Daily Note     Today's date: 2023  Patient name: Deion Wu  : 1950  MRN: [id number]  Referring provider: Cotlon Lees DO  Dx:   Encounter Diagnosis     ICD-10-CM    1. Weakness of left upper extremity  R29.898                      Subjective: Still weak      Objective: See treatment diary below      Assessment: Tolerated treatment well. Patient  is showing increased biceps function      Plan: Continue per plan of care.      Precautions:cervical laminectomy c3-6 8/15/23    [email or web address]  Access Code: [email or web address]        Manuals 12/28 11/15 11/21 11/28 11/30 11/4 12/6 12/11 12/15  RE                                                        HEP 3x day   Tband                                                                                                     Ther Ex             PROM L UE 5m 5m 2m          Wrist ext /rd 2#  3x10 #1 3x110 2#  3x10 2#  3x10 2#  3x10 2#  3x10 2#  3x10 2#  3x10  2#  3x10   Hammer sup Sm  3x10 SM 3x10 Sm  3x10 Sm  3x10 Sm  3x10 Sm  3x10 Sm  3x10        Flexbar twist /P/S          Red  3x10 Red  3x10   Shoulder flex 0 gravity 3x10 3x10 3x10 3x10 3x10 3x10 3x10 3x10 3x10 3x10   Elbow flex 0 graviy 3x10 #1 cuff 3x10 3x10 3x10 3x10 3x10 3x10 3x10     gripping   BOP RPW 2x30 RPW  2x30 RPW  2x30 GPW  3x10 GPW  3x10 GPW  3x10 GPW  3x10 BPW 3x10 BPW  3x10   UBE 3/3   5/5 3/3 3/3  3/3 3/3 3/3 3/3 3/3   Ther Activity             Pegs  30    30x     30x     Stress load    2m 2m 2m 1m 1m      Webslide row   Green  3x10 Black  3x10 Black  3x10 Black  3x10 Black  3x10 Black  3x10 Black  3x10 Black  3x10   Webslide elbow ext shl ext    Black  3x10 Black  3x10 Black  3x10 Black  3x10 Black  3x10 Black  3x10 Black  3x10   Flexbar P/S        Yellow  3x10     Chest press   supine         2# 3#  3x10   Shld flex   supine         2#  Bar  3x10 3#  bar  3x10   Elbow flex in sidelie  ) gravity         3x10 3x10

## 2024-01-03 ENCOUNTER — OFFICE VISIT (OUTPATIENT)
Dept: OCCUPATIONAL THERAPY | Facility: CLINIC | Age: 74
End: 2024-01-03
Payer: COMMERCIAL

## 2024-01-03 DIAGNOSIS — R29.898 WEAKNESS OF LEFT UPPER EXTREMITY: Primary | ICD-10-CM

## 2024-01-03 PROCEDURE — 97110 THERAPEUTIC EXERCISES: CPT | Performed by: OCCUPATIONAL THERAPIST

## 2024-01-03 PROCEDURE — 97112 NEUROMUSCULAR REEDUCATION: CPT | Performed by: OCCUPATIONAL THERAPIST

## 2024-01-03 NOTE — PROGRESS NOTES
Daily Note     Today's date: 1/3/2024  Patient name: Deion Wu  : 1950  MRN: [id number]  Referring provider: Colton Lees DO  Dx:   Encounter Diagnosis     ICD-10-CM    1. Weakness of left upper extremity  R29.898                      Subjective: I am doing better      Objective: See treatment diary below      Assessment: Tolerated treatment .well Patient  has improved biceps function .       Plan: Continue per plan of care.      Precautions:cervical laminectomy c3-6 8/15/23    [email or web address]  Access Code: [email or web address]        Manuals 12/28 1/3 11/21 11/28 11/30 11/4 12/6 12/11 12/15  RE                                                        HEP 3x day   Tband                                                                                                     Ther Ex             PROM L UE 5m 5m 2m          Wrist ext /rd 2#  3x10 #1 3x110 2#  3x10 2#  3x10 2#  3x10 2#  3x10 2#  3x10 2#  3x10  2#  3x10   Hammer sup Sm  3x10 SM 3x10 Sm  3x10 Sm  3x10 Sm  3x10 Sm  3x10 Sm  3x10        Flexbar twist /P/S          Red  3x10 Red  3x10   Shoulder flex 0 gravity 3x10 3x10 3x10 3x10 3x10 3x10 3x10 3x10 3x10 3x10   Elbow flex 0 graviy 3x10  cuff 3x10 3x10 3x10 3x10 3x10 3x10 3x10     gripping   BOP RPW 2x30 RPW  2x30 RPW  2x30 GPW  3x10 GPW  3x10 GPW  3x10 GPW  3x10 BPW 3x10 BPW  3x10   UBE 3/3  4/4 5/5 3/3 3/3  3/3 3/3 3/3 3/3 3/3   Ther Activity             Pegs  30    30x     30x     Stress load    2m 2m 2m 1m 1m      Webslide row   Green  3x10 Black  3x10 Black  3x10 Black  3x10 Black  3x10 Black  3x10 Black  3x10 Black  3x10   Webslide elbow ext shl ext    Black  3x10 Black  3x10 Black  3x10 Black  3x10 Black  3x10 Black  3x10 Black  3x10   Flexbar P/S        Yellow  3x10     Chest press   supine         2# 3#  3x10   Shld flex   supine  3#  3x10       2#  Bar  3x10 3#  bar  3x10   Elbow flex in sidelie  ) gravity  3x10       3x10 3x10

## 2024-01-10 ENCOUNTER — APPOINTMENT (OUTPATIENT)
Dept: OCCUPATIONAL THERAPY | Facility: CLINIC | Age: 74
End: 2024-01-10
Payer: COMMERCIAL

## 2024-01-16 ENCOUNTER — APPOINTMENT (OUTPATIENT)
Dept: OCCUPATIONAL THERAPY | Facility: CLINIC | Age: 74
End: 2024-01-16
Payer: COMMERCIAL

## 2024-01-18 ENCOUNTER — HOSPITAL ENCOUNTER (OUTPATIENT)
Dept: NON INVASIVE DIAGNOSTICS | Facility: HOSPITAL | Age: 74
Discharge: HOME | End: 2024-01-18
Payer: COMMERCIAL

## 2024-01-18 ENCOUNTER — OFFICE VISIT (OUTPATIENT)
Dept: OCCUPATIONAL THERAPY | Facility: CLINIC | Age: 74
End: 2024-01-18
Payer: COMMERCIAL

## 2024-01-18 DIAGNOSIS — R29.898 WEAKNESS OF LEFT UPPER EXTREMITY: Primary | ICD-10-CM

## 2024-01-18 DIAGNOSIS — R60.9 EDEMA, UNSPECIFIED: ICD-10-CM

## 2024-01-18 PROCEDURE — 93970 EXTREMITY STUDY: CPT

## 2024-01-18 PROCEDURE — 97112 NEUROMUSCULAR REEDUCATION: CPT | Performed by: OCCUPATIONAL THERAPIST

## 2024-01-18 PROCEDURE — 97110 THERAPEUTIC EXERCISES: CPT | Performed by: OCCUPATIONAL THERAPIST

## 2024-01-18 PROCEDURE — 93970 EXTREMITY STUDY: CPT | Performed by: SURGERY

## 2024-01-18 NOTE — PROGRESS NOTES
Daily Note     Today's date: 2024  Patient name: Deion Wu  : 1950  MRN: [id number]  Referring provider: Colton Lees DO  Dx:   Encounter Diagnosis     ICD-10-CM    1. Weakness of left upper extremity  R29.898                      Subjective: I am doing ok       Objective: See treatment diary below      Assessment: Tolerated treatment well. Patient  has improved biceps function . He is limited with strength against gravity.      Plan: Continue per plan of care.      Precautions:cervical laminectomy c3-6 8/15/23    [email or web address]  Access Code: [email or web address]        Manuals 12/28 1/3 1/18 11/28 11/30 11/4 12/6 12/11 12/15  RE                                                        HEP 3x day   Tband                                                                                                     Ther Ex             PROM L UE 5m 5m 2m          Wrist ext /rd 2#  3x10 #1 3x110 2#  3x10 2#  3x10 2#  3x10 2#  3x10 2#  3x10 2#  3x10  2#  3x10   Hammer sup Sm  3x10 SM 3x10 Sm  3x10 Sm  3x10 Sm  3x10 Sm  3x10 Sm  3x10        Flexbar twist /P/S          Red  3x10 Red  3x10   Shoulder flex 0 gravity 3x10 3x10 3x10 3x10 3x10 3x10 3x10 3x10 3x10 3x10   Elbow flex 0 graviy 3x10  cuff 3x10 3x10 3x10 3x10 3x10 3x10 3x10     gripping   BOP RPW 2x30 RPW  2x30 RPW  2x30 GPW  3x10 GPW  3x10 GPW  3x10 GPW  3x10 BPW 3x10 BPW  3x10   UBE 3/3  4/4 5/5 3/3 3/3  3/3 3/3 3/3 3/3 3/3   Ther Activity             Pegs  30          30x     Stress load      2m 2m 1m 1m      Webslide row   Green  3x10 Black  3x10 Black  3x10 Black  3x10 Black  3x10 Black  3x10 Black  3x10 Black  3x10   Webslide elbow ext shl ext   Black  3x10 Black  3x10 Black  3x10 Black  3x10 Black  3x10 Black  3x10 Black  3x10 Black  3x10   Flexbar P/S   Red  P/S     Yellow  3x10     Chest press   supine         2# 3#  3x10   Shld flex   supine  3# dowel   3x10 3# dowel   3x10      2#  Bar  3x10 3#  bar  3x10   Elbow flex in sidelie  )  gravity  3x10 3x10      3x10 3x10

## 2024-01-23 ENCOUNTER — OFFICE VISIT (OUTPATIENT)
Dept: OCCUPATIONAL THERAPY | Facility: CLINIC | Age: 74
End: 2024-01-23
Payer: COMMERCIAL

## 2024-01-23 DIAGNOSIS — R29.898 WEAKNESS OF LEFT UPPER EXTREMITY: Primary | ICD-10-CM

## 2024-01-23 PROCEDURE — 97112 NEUROMUSCULAR REEDUCATION: CPT | Performed by: OCCUPATIONAL THERAPIST

## 2024-01-23 PROCEDURE — 97110 THERAPEUTIC EXERCISES: CPT | Performed by: OCCUPATIONAL THERAPIST

## 2024-01-23 NOTE — PROGRESS NOTES
Daily Note     Today's date: 2024  Patient name: Deion Wu  : 1950  MRN: [id number]  Referring provider: Colton Lees DO  Dx:   Encounter Diagnosis     ICD-10-CM    1. Weakness of left upper extremity  R29.898                      Subjective: slow improvement       Objective: See treatment diary below      Assessment: Tolerated treatment well. Patient  muscle function is showing progress .      Plan: Continue per plan of care.      Precautions:cervical laminectomy c3-6 8/15/23    [email or web address]  Access Code: [email or web address]        Manuals 12/28 1/3 1/18 1/23 11/30 11/4 12/6 12/11 12/15  RE                                                         HEP 3x day                                                                                                        Ther Ex             PROM L UE 5m 5m 2m          Wrist ext /rd 2#  3x10 #1 3x110 2#  3x10 2#  3x10 2#  3x10 2#  3x10 2#  3x10 2#  3x10  2#  3x10   Hammer sup Sm  3x10 SM 3x10 Sm  3x10 Sm  3x10 Sm  3x10 Sm  3x10 Sm  3x10        Flexbar twist /P/S          Red  3x10 Red  3x10   Shoulder flex 0 gravity 3x10 3x10 3x10 3x10 3x10 3x10 3x10 3x10 3x10 3x10   Elbow flex 0 graviy 3x10  cuff 3x10 3x10 3x10 3x10 3x10 3x10 3x10     gripping   BOP RPW 2x30 RPW  2x30 RPW  2x30 GPW  3x10 GPW  3x10 GPW  3x10 GPW  3x10 BPW 3x10 BPW  3x10   UBE 3/3  4/4 5/5 3/3 3/3  3/3 3/3 3/3 3/3 3/3   Ther Activity             Pegs  30          30x     Stress load      2m 2m 1m 1m      Webslide row   Green  3x10 Black  3x10 Black  3x10 Black  3x10 Black  3x10 Black  3x10 Black  3x10 Black  3x10   Webslide elbow ext shl ext   Black  3x10 Black  3x10 Black  3x10 Black  3x10 Black  3x10 Black  3x10 Black  3x10 Black  3x10   Flexbar P/S   Red  P/S Red  3x10    Yellow  3x10     Chest press   supine         2# 3#  3x10   Shld flex   supine  3# dowel   3x10 3# dowel   3x10 3#  Dowel  3x10     2#  Bar  3x10 3#  bar  3x10   Elbow flex in sidelie  ) gravity  3x10  3x10 3x10     3x10 3x10

## 2024-01-25 ENCOUNTER — OFFICE VISIT (OUTPATIENT)
Dept: OCCUPATIONAL THERAPY | Facility: CLINIC | Age: 74
End: 2024-01-25
Payer: COMMERCIAL

## 2024-01-25 DIAGNOSIS — R29.898 WEAKNESS OF LEFT UPPER EXTREMITY: Primary | ICD-10-CM

## 2024-01-25 PROCEDURE — 97112 NEUROMUSCULAR REEDUCATION: CPT | Performed by: OCCUPATIONAL THERAPIST

## 2024-01-25 PROCEDURE — 97110 THERAPEUTIC EXERCISES: CPT | Performed by: OCCUPATIONAL THERAPIST

## 2024-01-25 NOTE — PROGRESS NOTES
Daily Note     Today's date: 2024  Patient name: Deion Wu  : 1950  MRN: [id number]  Referring provider: Colton Lees DO  Dx:   Encounter Diagnosis     ICD-10-CM    1. Weakness of left upper extremity  R29.898                      Subjective: I am getting better      Objective: See treatment diary below      Assessment: Tolerated treatment well. Patient  was able to bring hand to mouth against gravity . He has fatigue post tx and is unable to bring hand to mouth post tx .       Plan: Continue per plan of care.      Precautions:cervical laminectomy c3-6 8/15/23    [email or web address]  Access Code: [email or web address]        Manuals 12/28 1/3 1/18 1/23 1/25 11/4 12/6 12/11 12/15  RE                                                         HEP 3x day                                                                                                        Ther Ex             PROM L UE 5m 5m 2m          Wrist ext /rd 2#  3x10 #1 3x110 2#  3x10 2#  3x10 2#  3x10 2#  3x10 2#  3x10 2#  3x10  2#  3x10   Hammer sup Sm  3x10 SM 3x10 Sm  3x10 Sm  3x10 Sm  3x10 Sm  3x10 Sm  3x10        Flexbar twist /P/S          Red  3x10 Red  3x10   Shoulder flex 0 gravity 3x10 3x10 3x10 3x10 3x10 3x10 3x10 3x10 3x10 3x10   Elbow flex 0 graviy 3x10  cuff 3x10 3x10 3x10 3x10 3x10 3x10 3x10     gripping   BOP RPW 2x30 RPW  2x30 RPW  2x30 BPW  3x10 GPW  3x10 GPW  3x10 GPW  3x10 BPW 3x10 BPW  3x10   UBE 3/3  4/ 5/5 3/3 4/4 3/3 3/3 3/3 3/3 3/3   Ther Activity             Pegs  30          30x     Stress load      2m 2m 1m 1m      Webslide row   Green  3x10 Black  3x10 Black  3x10 Black  3x10 Black  3x10 Black  3x10 Black  3x10 Black  3x10   Webslide elbow ext shl ext   Black  3x10 Black  3x10 Black  3x10 Black  3x10 Black  3x10 Black  3x10 Black  3x10 Black  3x10   Flexbar P/S   Red  P/S Red  3x10 Red  3x10   Yellow  3x10     Chest press   supine         2# 3#  3x10   Shld flex   supine  3# dowel   3x10 3# dowel   3x10  3#  Dowel  3x10 5#  Dowel   3x10    2#  Bar  3x10 3#  bar  3x10   Elbow flex in sidelie  ) gravity  3x10 3x10 3x10 3x10    3x10 3x10

## 2024-01-30 ENCOUNTER — OFFICE VISIT (OUTPATIENT)
Dept: OCCUPATIONAL THERAPY | Facility: CLINIC | Age: 74
End: 2024-01-30
Payer: COMMERCIAL

## 2024-01-30 DIAGNOSIS — R29.898 WEAKNESS OF LEFT UPPER EXTREMITY: Primary | ICD-10-CM

## 2024-01-30 PROCEDURE — 97110 THERAPEUTIC EXERCISES: CPT | Performed by: OCCUPATIONAL THERAPIST

## 2024-01-30 PROCEDURE — 97112 NEUROMUSCULAR REEDUCATION: CPT | Performed by: OCCUPATIONAL THERAPIST

## 2024-01-30 NOTE — PROGRESS NOTES
Daily Note     Today's date: 2024  Patient name: Deion Wu  : 1950  MRN: [id number]  Referring provider: Colton Lees DO  Dx:   Encounter Diagnosis     ICD-10-CM    1. Weakness of left upper extremity  R29.898                      Subjective: I am using compression socks . I had difficulty and my arm is tired      Objective: See treatment diary below      Assessment: Tolerated treatment fair. Patient  has notable fatigue today       Plan: Continue per plan of care.      Precautions:cervical laminectomy c3-6 8/15/23    [email or web address]  Access Code: [email or web address]        Manuals 12/28 1/3 1/18 1/23 1/25 1/30 12/6 12/11 12/15  RE                                                         HEP 3x day                                                                                                        Ther Ex             PROM L UE 5m 5m 2m          Wrist ext /rd 2#  3x10 #1 3x110 2#  3x10 2#  3x10 2#  3x10 2#  3x10 2#  3x10 2#  3x10  2#  3x10   Hammer sup Sm  3x10 SM 3x10 Sm  3x10 Sm  3x10 Sm  3x10 Sm  3x10 Sm  3x10        Flexbar twist /P/S          Red  3x10 Red  3x10   Shoulder flex 0 gravity 3x10 3x10 3x10 3x10 3x10 3x10 3x10 3x10 3x10 3x10   Elbow flex 0 graviy 3x10  cuff 3x10 3x10 3x10 3x10 3x10 3x10 3x10     gripping   BOP RPW 2x30 RPW  2x30 RPW  2x30 BPW  3x10 GPW  3x10 GPW  3x10 GPW  3x10 BPW 3x10 BPW  3x10   UBE 3/3  4/ 5/5 3/3 4/4 4/4 3/3 3/3 3/3 3/3   Ther Activity             Pegs  30          30x     Stress load      2m 2m 1m 1m      Webslide row   Green  3x10 Black  3x10 Black  3x10 Black  3x10 Black  3x10 Black  3x10 Black  3x10 Black  3x10   Webslide elbow ext shl ext   Black  3x10 Black  3x10 Black  3x10 Black  3x10 Black  3x10 Black  3x10 Black  3x10 Black  3x10   Flexbar P/S   Red  P/S Red  3x10 Red  3x10 Red  3x10  Yellow  3x10     Chest press   supine         2# 3#  3x10   Shld flex   supine  3# dowel   3x10 3# dowel   3x10 3#  Dowel  3x10 5#  Dowel   3x10  5#  Dowel  3x10   2#  Bar  3x10 3#  bar  3x10   Elbow flex in sidelie  ) gravity  3x10 3x10 3x10 3x10 3x10   3x10 3x10

## 2024-02-01 ENCOUNTER — OFFICE VISIT (OUTPATIENT)
Dept: OCCUPATIONAL THERAPY | Facility: CLINIC | Age: 74
End: 2024-02-01
Payer: COMMERCIAL

## 2024-02-01 DIAGNOSIS — R29.898 WEAKNESS OF LEFT UPPER EXTREMITY: Primary | ICD-10-CM

## 2024-02-01 PROCEDURE — 97110 THERAPEUTIC EXERCISES: CPT | Performed by: OCCUPATIONAL THERAPIST

## 2024-02-01 PROCEDURE — 97112 NEUROMUSCULAR REEDUCATION: CPT | Performed by: OCCUPATIONAL THERAPIST

## 2024-02-01 NOTE — PROGRESS NOTES
Daily Note     Today's date: 2024  Patient name: Deion Wu  : 1950  MRN: [id number]  Referring provider: Colton Lees DO  Dx:   Encounter Diagnosis     ICD-10-CM    1. Weakness of left upper extremity  R29.898                      Subjective: I am doing ok       Objective: See treatment diary below      Assessment: Tolerated treatment fair. Patient  remains limited with use of L for ADL - self care, dressing , bring hand to face       Plan: Continue per plan of care.      Precautions:cervical laminectomy c3-6 8/15/23    [email or web address]  Access Code: [email or web address]        Manuals 12/28 1/3 1/18 1/23 1/25 1/30 12/6 2/1 12/15  RE                                                         HEP 3x day                                                                                                        Ther Ex             PROM L UE 5m 5m 2m          Wrist ext /rd 2#  3x10 #1 3x110 2#  3x10 2#  3x10 2#  3x10 2#  3x10 2#  3x10 2#  3x10  2#  3x10   Hammer sup Sm  3x10 SM 3x10 Sm  3x10 Sm  3x10 Sm  3x10 Sm  3x10 Sm  3x10  Sm  3x10      Flexbar twist /P/S          Red  3x10 Red  3x10   Shoulder flex 0 gravity 3x10 3x10 3x10 3x10 3x10 3x10 3x10 3x10 3x10 3x10   Elbow flex 0 graviy 3x10  cuff 3x10 3x10 3x10 3x10 3x10 3x10 3x10     gripping   BOP RPW 2x30 RPW  2x30 RPW  2x30 BPW  3x10 GPW  3x10 GPW  3x10 BPW  3x10 BPW 3x10 BPW  3x10   UBE 3/3  4/4 5/5 3/3 4/4 4/4 3/3 4/4 3/3 3/3   Ther Activity             Pegs  30                Stress load      2m 2m 1m 1m      Webslide row   Green  3x10 Black  3x10 Black  3x10 Black  3x10 Black  3x10 Black  3x10 Black  3x10 Black  3x10   Webslide elbow ext shl ext   Black  3x10 Black  3x10 Black  3x10 Black  3x10 Black  3x10 Black  3x10 Black  3x10 Black  3x10   Flexbar P/S   Red  P/S Red  3x10 Red  3x10 Red  3x10  red  3x10     Chest press   supine         2# 3#  3x10   Shld flex   supine  3# dowel   3x10 3# dowel   3x10 3#  Dowel  3x10 5#  Dowel   3x10  5#  Dowel  3x10 5#  Dowel  3x10 5#  dowel 2#  Bar  3x10 3#  bar  3x10   Elbow flex in sidelie  ) gravity  3x10 3x10 3x10 3x10 3x10 3x10 3x10 3x10 3x10

## 2024-02-04 NOTE — PROGRESS NOTES
PATIENT:  Deion Wu  1950    ASSESSMENT:  1. Onychomycosis        2. Corns and callus        3. Peripheral vascular disease, unspecified (HCC)              No orders of the defined types were placed in this encounter.       PLAN:  The patient was educated in proper foot wear.  Also discussed daily foot assessment and proper foot care.    The patient will follow up in 9-12 weeks for foot exam and care.      Procedures: 84948, [id number]  All mycotic toenails were reduced and debrided in length, width, and girth using a nail nipper and dremel.  All non-dystrphic nails also trimmed.    All hyperkeratotic skin lesion(s) if present were sharply pared with a scalpel / forcep with no bleeding or evidence of ulceration.    Patient tolerated procedure(s) well without complications.    Procedures     HPI:  Deion Wu is a 73 y.o.year old male seen for at risk foot exam and care.      The patient complained of thick toenails and painful lesions.  The patient has class findings with PVD.  Overall patient is clinically unchanged from prior visit.  The patient denied any acute pedal disorder, redness, acute swelling, or recent injury.      The following portions of the patient's history were reviewed and updated as appropriate: allergies, current medications, past family history, past medical history, past social history, past surgical history and problem list.    REVIEW OF SYSTEMS:  GENERAL: No fever or chills  HEART: No chest pain, or palpitation  RESPIRATORY:  No acute SOB or cough  GI: No Nausea, vomit or diarrhea  NEUROLOGIC: No syncope or acute weakness    PHYSICAL EXAM:    /80   Pulse (!) 52   Ht 6' (1.829 m)   Wt 72.6 kg (160 lb)   BMI 21.70 kg/m²     VASCULAR EXAM  Posterior tibial artery  +1 bilateral.    Dorsalis pedis artery absent bilateral.  The patient has class findings with skin atrophy, lack of digital hair, and nail dystrophy.    There is no lower extremity edema bilaterally.    Venous  stasis skin changes noted No BLE.    NEUROLOGIC EXAM  Sensation is intact to light touch.  Sensation is intact to 10gm monofilament.  No focal neurologic deficit.          DERMATOLOGIC EXAM:   No ulcer or cellulitis noted.  Texture, Tone and Turgor are diminished? Yes  The patient has dystrophic/hypertrophic toenails with yellow/white discoloration, onycholysis, and subungal debris.   Fungal odor and brittle nature noted.  Pain with palpation.  Periungual erythema noted.  Right foot nails severely dystrophic x1 with 0.7 cm ave thickness (hallux)  Left foot nails dystrophic x5 with 0.4 cm ave thickness (1 through 5)  Patient has hyperkeratotic lesions noted:   Right foot at lateral hallux IPJ and medial second PIPJ within first interspace.  Left foot at Sub #4 MPJ.  No notable suspicious skin lesions.      MUSCULOSKELETAL EXAM:   No acute joint pain, edema, or redness.  No acute musculoskeletal problem.    Patient has deformity including digital contractures.       Risk Category/Class Findings: Q8 (B2 ABC, B3)    A1)  Has the patient had a previous amputation of the foot or integral skeletal portion thereof? No  B1)  Does the patient have absent posterior tibial pulse? No  B3)  Does the patient have absent dorsalis pedis? Yes  B2)  Does the patient have three of the following? Yes           1.  Hair growth (increased or decreased), 2.  Nail changes (thickening) and 3.  Pigmentary changes (discoloring)  C)  Does the patient have two of the following and one above? No

## 2024-02-06 ENCOUNTER — OFFICE VISIT (OUTPATIENT)
Dept: OCCUPATIONAL THERAPY | Facility: CLINIC | Age: 74
End: 2024-02-06
Payer: COMMERCIAL

## 2024-02-06 DIAGNOSIS — R29.898 WEAKNESS OF LEFT UPPER EXTREMITY: Primary | ICD-10-CM

## 2024-02-06 PROCEDURE — 97110 THERAPEUTIC EXERCISES: CPT | Performed by: OCCUPATIONAL THERAPIST

## 2024-02-06 PROCEDURE — 97112 NEUROMUSCULAR REEDUCATION: CPT | Performed by: OCCUPATIONAL THERAPIST

## 2024-02-06 NOTE — PROGRESS NOTES
Daily Note     Today's date: 2024  Patient name: Deion Wu  : 1950  MRN: [id number]  Referring provider: Colton Lees DO  Dx:   Encounter Diagnosis     ICD-10-CM    1. Weakness of left upper extremity  R29.898                      Subjective: I am doing  ok      Objective: See treatment diary below      Assessment: Tolerated treatment well. Patient  is making slow gains      Plan: Continue per plan of care.      Precautions:cervical laminectomy c3-6 8/15/23    [employer]  Access Code: [email or web address]        Manuals 12/28 1/3 1/18 1/23 1/25 1/30 2/6 2/1 12/15  RE                                                         HEP 3x day                                                                                                        Ther Ex             PROM L UE 5m 5m 2m          Wrist ext /rd 2#  3x10 #1 3x110 2#  3x10 2#  3x10 2#  3x10 2#  3x10 2#  3x10 2#  3x10  2#  3x10   Hammer sup Sm  3x10 SM 3x10 Sm  3x10 Sm  3x10 Sm  3x10 Sm  3x10 Sm  3x10  Sm  3x10      Flexbar twist /P/S          Red  3x10 Red  3x10   Shoulder flex 0 gravity 3x10 3x10 3x10 3x10 3x10 3x10 3x10 3x10 3x10 3x10   Elbow flex 0 graviy 3x10  cuff 3x10 3x10 3x10 3x10 3x10 3x10 3x10     gripping   BOP RPW 2x30 RPW  2x30 RPW  2x30 BPW  3x10 GPW  3x10 GPW  3x10 BPW  3x10 BPW 3x10 BPW  3x10   UBE 3/3  4/4 5/5 3/3 4//4 3/3 4/4 3/3 3/3   Ther Activity             Pegs  30                Stress load      2m 2m 1m 1m      Webslide row   Green  3x10 Black  3x10 Black  3x10 Black  3x10 Black  3x10 Black  3x10 Black  3x10 Black  3x10   Webslide elbow ext shl ext   Black  3x10 Black  3x10 Black  3x10 Black  3x10 Black  3x10 Black  3x10 Black  3x10 Black  3x10   Flexbar P/S   Red  P/S Red  3x10 Red  3x10 Red  3x10  red  3x10     Chest press   supine         2# 3#  3x10   Shld flex   supine  3# dowel   3x10 3# dowel   3x10 3#  Dowel  3x10 5#  Dowel   3x10 5#  Dowel  3x10 5#  Dowel  3x10 5#  dowel 2#  Bar  3x10  3#  bar  3x10   Elbow flex in sidelie  ) gravity  3x10 3x10 3x10 3x10 3x10 3x10 3x10 3x10 3x10

## 2024-02-08 ENCOUNTER — OFFICE VISIT (OUTPATIENT)
Dept: OCCUPATIONAL THERAPY | Facility: CLINIC | Age: 74
End: 2024-02-08
Payer: COMMERCIAL

## 2024-02-08 DIAGNOSIS — R29.898 WEAKNESS OF LEFT UPPER EXTREMITY: Primary | ICD-10-CM

## 2024-02-08 PROCEDURE — 97112 NEUROMUSCULAR REEDUCATION: CPT | Performed by: OCCUPATIONAL THERAPIST

## 2024-02-08 PROCEDURE — 97110 THERAPEUTIC EXERCISES: CPT | Performed by: OCCUPATIONAL THERAPIST

## 2024-02-08 NOTE — PROGRESS NOTES
Daily Note     Today's date: 2024  Patient name: Deion Wu  : 1950  MRN: [id number]  Referring provider: Colton Lees DO  Dx:   Encounter Diagnosis     ICD-10-CM    1. Weakness of left upper extremity  R29.898                      Subjective: I am doing better      Objective: See treatment diary below      Assessment: Tolerated treatment well. Patient  has  improved strength in 0 gravity      Plan: Continue per plan of care. /reeval     Precautions:cervical laminectomy c3-6 8/15/23    [email or web address].[employer]  Access Code: [email or web address]        Manuals 12/28 1/3 1/18 1/23 1/25 1/30 2/6 2/8 12/15  RE                                                         HEP 3x day                                                                                                        Ther Ex             PROM L UE 5m 5m 2m          Wrist ext /rd 2#  3x10 #1 3x110 2#  3x10 2#  3x10 2#  3x10 2#  3x10 2#  3x10 2#  3x10  2#  3x10   Hammer sup Sm  3x10 SM 3x10 Sm  3x10 Sm  3x10 Sm  3x10 Sm  3x10 Sm  3x10  Sm  3x10      Flexbar twist /P/S          Red  3x10 Red  3x10   Shoulder flex 0 gravity 3x10 3x10 3x10 3x10 3x10 3x10 3x10 3x10 3x10 3x10   Elbow flex 0 graviy 3x10  cuff 3x10 3x10 3x10 3x10 3x10 3x10 3x10     gripping   BOP RPW 2x30 RPW  2x30 RPW  2x30 BPW  3x10 GPW  3x10 GPW  3x10 BPW  3x10 BPW 3x10 BPW  3x10   UBE 3/3  4/4 5/5 3/3 4/ 4/ 3/3 4/4 3/3 3/3   Ther Activity             Pegs  30                Stress load      2m 2m 1m 1m      Webslide row   Green  3x10 Black  3x10 Black  3x10 Black  3x10 Black  3x10 Black  3x10 Black  3x10 Black  3x10   Webslide elbow ext shl ext   Black  3x10 Black  3x10 Black  3x10 Black  3x10 Black  3x10 Black  3x10 Black  3x10 Black  3x10   Flexbar P/S   Red  P/S Red  3x10 Red  3x10 Red  3x10  red  3x10     Chest press   supine         2# 3#  3x10   Shld flex   supine  3# dowel   3x10 3# dowel   3x10 3#  Dowel  3x10 5#  Dowel   3x10 5#  Dowel  3x10 5#  Dowel  3x10  5#  dowel 2#  Bar  3x10 3#  bar  3x10   Elbow flex in sidelie  ) gravity  3x10 3x10 3x10 3x10 3x10 3x10 3x10 3x10 3x10

## 2024-02-13 ENCOUNTER — APPOINTMENT (OUTPATIENT)
Dept: OCCUPATIONAL THERAPY | Facility: CLINIC | Age: 74
End: 2024-02-13
Payer: COMMERCIAL

## 2024-02-16 ENCOUNTER — OFFICE VISIT (OUTPATIENT)
Dept: OCCUPATIONAL THERAPY | Facility: CLINIC | Age: 74
End: 2024-02-16
Payer: COMMERCIAL

## 2024-02-16 DIAGNOSIS — R29.898 WEAKNESS OF LEFT UPPER EXTREMITY: Primary | ICD-10-CM

## 2024-02-16 PROCEDURE — 97112 NEUROMUSCULAR REEDUCATION: CPT | Performed by: OCCUPATIONAL THERAPIST

## 2024-02-16 PROCEDURE — 97110 THERAPEUTIC EXERCISES: CPT | Performed by: OCCUPATIONAL THERAPIST

## 2024-02-16 NOTE — PROGRESS NOTES
Daily Note     Today's date: 2024  Patient name: Deion Wu  : 1950  MRN: [id number]  Referring provider: Colton Lees DO  Dx:   Encounter Diagnosis     ICD-10-CM    1. Weakness of left upper extremity  R29.898                      Subjective: still weak     Assessment  Assessment details: Willie presents with L UE weakness since cervical laminectomy. . He has a history of B Shoulder replacements that have had restricted ROM since surgery.  He has weakness for all shoulder motions . He has limited fine motor skill which may be from inablilty to stabilize  wrist and elbow . He has weak biceps and wrist ext. He Is unable to use his L for self care, eating , lifting , housework. He is seeing PT as well . He has help from his wife as needed.   Impairments: abnormal muscle firing, abnormal or restricted ROM, activity intolerance, impaired physical strength and lacks appropriate home exercise program    12/15/23- Willie has improved biceps strength , he has full ROM in zero gravity . He has been compliant with HEP and tx. He still does not have use of L for ADL .      24- Willie is making slow gains with return of biceps function . He is still unable to use L for ADL- hair , face. He is unable to lift , carry , push , pull with L .    Functional limitations: unable to use L for self care, housework  Symptom irritability: moderateUnderstanding of Dx/Px/POC: good   Prognosis: good    Goals  STG - 1 wk  1-I with HEP- met   2- improve  5#- met     LTG- 8 wks  1- improve biceps to 5/5  2- improve  wrist ext to 5/5  3- I ADL - self care , meal prep , housework     Plan  Patient would benefit from:  skilled occupational therapy  Planned therapy interventions: home exercise program, strengthening, ADL retraining, kinesiology taping, graded activity and functional ROM exercises  Frequency: 1-2x wk.  Duration in weeks: 8  Plan of Care beginning date: 24  Treatment plan discussed with:  patient        Subjective Evaluation    History of Present Illness  Date of surgery: 8/15/2023  Mechanism of injury: surgery  Mechanism of injury: Willie had cervical laminectomy on 8/15/23. 2 weeks post surgery he developed L UE weakness. He has had PT  post op.  Quality of life: good    Patient Goals  Patient goals for therapy: increased motion, increased strength and independence with ADLs/IADLs  Patient goal: regain use L UE  Pain  Current pain ratin  At best pain ratin  At worst pain ratin  Location: L UE  Quality: dull ache  Exacerbated by: use of L UE.  Progression: improved    Social Support  Steps to enter house: yes  Stairs in house: yes (basemant)   Lives in: multiple-level home  Lives with: spouse and adult children    Employment status: not working  Hand dominance: right    Treatments  Previous treatment: physical therapy        Objective     Neurological Testing     Additional Neurological Details  C/o paresthesias B     Active Range of Motion   Left Shoulder   Flexion: 40 degrees   Abduction: 35 degrees   External rotation 0°: WFL  Internal rotation 0°: 0 degrees     Left Elbow   Flexion: 60 degrees   Extension: 20 degrees   Forearm supination: WFL  Forearm pronation: WFL    Left Wrist   Normal active range of motion    Scapular Mobility     Additional Scapular Mobility Details  Able to shrug, retract , protract     Strength/Myotome Testing     Left Elbow   Flexion:3+  Extension: 5    Left Wrist/Hand   Wrist extension: 4+  Wrist flexion: 5  Radial deviation: 5  Ulnar deviation: 5     (2nd hand position)     Trial 1: 20 previous 10- unable to stabilize wrist when gripping - loss of power    Right Wrist/Hand      (2nd hand position)     Trial 1: 48    Additional Strength Details  2/5 grossly  EPL - 5/5 , E I 5/5  , EDM 55                  Objective: See treatment diary below           Plan: Continue per plan of care.      Precautions:cervical laminectomy c3-6  8/15/23    [email or web address]  Access Code: [email or web address]        Manuals 12/28 1/3 1/18 1/23 1/25 1/30 2/6 2/8 2/16  RE 12/21                                                        HEP 3x day                                                                                                        Ther Ex             PROM L UE 5m 5m 2m          Wrist ext /rd 2#  3x10 #1 3x110 2#  3x10 2#  3x10 2#  3x10 2#  3x10 2#  3x10 2#  3x10  2#  3x10   Hammer sup Sm  3x10 SM 3x10 Sm  3x10 Sm  3x10 Sm  3x10 Sm  3x10 Sm  3x10  Sm  3x10      Flexbar twist /P/S          Red  3x10 Red  3x10   Shoulder flex 0 gravity 3x10 3x10 3x10 3x10 3x10 3x10 3x10 3x10 3x10 3x10   Elbow flex 0 graviy 3x10  cuff 3x10 3x10 3x10 3x10 3x10 3x10 3x10     gripping   BOP RPW 2x30 RPW  2x30 RPW  2x30 BPW  3x10 GPW  3x10 GPW  3x10 BPW  3x10 BPW 3x10 BPW  3x10   UBE 3/3  4/4 5/5 3/3 4/4 4/4 3/3 4/4 3/3 3/3   Ther Activity             Pegs  30                Stress load      2m 2m 1m 1m      Webslide row   Green  3x10 Black  3x10 Black  3x10 Black  3x10 Black  3x10 Black  3x10 Black  3x10 Black  3x10   Webslide elbow ext shl ext   Black  3x10 Black  3x10 Black  3x10 Black  3x10 Black  3x10 Black  3x10 Black  3x10 Black  3x10   Flexbar P/S   Red  P/S Red  3x10 Red  3x10 Red  3x10  red  3x10 Green sup    Red pro   3x10    Chest press   supine         2# 3#  3x10   Shld flex   supine  3# dowel   3x10 3# dowel   3x10 3#  Dowel  3x10 5#  Dowel   3x10 5#  Dowel  3x10 5#  Dowel  3x10 5#  dowel 5#  dowel  3x10 3#  bar  3x10   Elbow flex in sidelie  ) gravity  3x10 3x10 3x10 3x10 3x10 3x10 3x10 3x10 3x10

## 2024-02-20 ENCOUNTER — OFFICE VISIT (OUTPATIENT)
Dept: OCCUPATIONAL THERAPY | Facility: CLINIC | Age: 74
End: 2024-02-20
Payer: COMMERCIAL

## 2024-02-20 DIAGNOSIS — R29.898 WEAKNESS OF LEFT UPPER EXTREMITY: Primary | ICD-10-CM

## 2024-02-20 PROCEDURE — 97112 NEUROMUSCULAR REEDUCATION: CPT | Performed by: OCCUPATIONAL THERAPIST

## 2024-02-20 PROCEDURE — 97110 THERAPEUTIC EXERCISES: CPT | Performed by: OCCUPATIONAL THERAPIST

## 2024-02-20 NOTE — PROGRESS NOTES
Daily Note     Today's date: 2024  Patient name: Deion Wu  : 1950  MRN: [id number]  Referring provider: Colton Lees DO  Dx:   Encounter Diagnosis     ICD-10-CM    1. Weakness of left upper extremity  R29.898                      Subjective: I wish arm was stronger      Objective: See treatment diary below      Assessment: Tolerated treatment fair. Patient was able to bring hand to mouth 4 x     Plan: Continue per plan of care.      Precautions:cervical laminectomy c3-6 8/15/23    [email or web address]  Access Code: [email or web address]        Manuals 12/28 1/3 1/18 1/23 1/25 1/30 2/6 2/8 2/16  RE                                                         HEP 3x day                                                                                                        Ther Ex             PROM L UE 5m 5m 2m          Wrist ext /rd 2#  3x10 #1 3x110 2#  3x10 2#  3x10 2#  3x10 2#  3x10 2#  3x10 2#  3x10  2#  3x10   Hammer sup Sm  3x10 SM 3x10 Sm  3x10 Sm  3x10 Sm  3x10 Sm  3x10 Sm  3x10  Sm  3x10      Flexbar twist /P/S          Red  3x10 Red  3x10   Shoulder flex 0 gravity 3x10 3x10 3x10 3x10 3x10 3x10 3x10 3x10 3x10 3x10   Elbow flex 0 graviy 3x10  cuff 3x10 3x10 3x10 3x10 3x10 3x10 3x10  1#  Cuff  3x10   gripping   BOP RPW 2x30 RPW  2x30 RPW  2x30 BPW  3x10 GPW  3x10 GPW  3x10 BPW  3x10 BPW 3x10 BPW  3x10   UBE 3/3  4/4 5/5 3/3 4/4 4/4 3/3 4/4 3/3     Ther Activity             Flexbar twist                Red  3x10   Stress load      2m 2m 1m 1m      Webslide row   Green  3x10 Black  3x10 Black  3x10 Black  3x10 Black  3x10 Black  3x10 Black  3x10 Black  3x10   Webslide elbow ext shl ext   Black  3x10 Black  3x10 Black  3x10 Black  3x10 Black  3x10 Black  3x10 Black  3x10 Black  3x10   Flexbar P/S   Red  P/S Red  3x10 Red  3x10 Red  3x10  red  3x10 Green sup    Red pro   3x10 Red  3x10   Chest press   supine         2# 5#  3x10   Shld flex   supine  3# dowel   3x10 3# dowel   3x10 3#  Dowel  3x10  5#  Dowel   3x10 5#  Dowel  3x10 5#  Dowel  3x10 5#  dowel 5#  dowel  3x10 5#  bar  3x10   Elbow flex in sidelie  ) gravity  3x10 3x10 3x10 3x10 3x10 3x10 3x10 3x10 3x10  1#

## 2024-02-22 ENCOUNTER — OFFICE VISIT (OUTPATIENT)
Dept: PODIATRY | Facility: CLINIC | Age: 74
End: 2024-02-22
Payer: COMMERCIAL

## 2024-02-22 VITALS — SYSTOLIC BLOOD PRESSURE: 104 MMHG | DIASTOLIC BLOOD PRESSURE: 74 MMHG

## 2024-02-22 DIAGNOSIS — L84 CORNS AND CALLUS: ICD-10-CM

## 2024-02-22 DIAGNOSIS — B35.1 ONYCHOMYCOSIS: Primary | ICD-10-CM

## 2024-02-22 DIAGNOSIS — I73.9 PERIPHERAL VASCULAR DISEASE, UNSPECIFIED (HCC): ICD-10-CM

## 2024-02-22 PROCEDURE — 11056 PARNG/CUTG B9 HYPRKR LES 2-4: CPT | Performed by: PODIATRIST

## 2024-02-22 PROCEDURE — 11721 DEBRIDE NAIL 6 OR MORE: CPT | Performed by: PODIATRIST

## 2024-02-22 RX ORDER — ESCITALOPRAM OXALATE 10 MG/1
10 TABLET ORAL DAILY
COMMUNITY

## 2024-02-22 RX ORDER — FUROSEMIDE 20 MG/1
TABLET ORAL EVERY 24 HOURS
COMMUNITY
Start: 2024-02-01

## 2024-02-22 RX ORDER — ACETAMINOPHEN 325 MG/1
TABLET ORAL
COMMUNITY
Start: 2023-08-22

## 2024-02-22 NOTE — PROGRESS NOTES
PATIENT:  Deion Wu  1950    ASSESSMENT:  1. Onychomycosis        2. Corns and callus        3. Peripheral vascular disease, unspecified (HCC)              No orders of the defined types were placed in this encounter.       PLAN:  The patient was educated in proper foot wear.  Also discussed daily foot assessment and proper foot care.    The patient will follow up in 9-12 weeks for foot exam and care.      Procedures: 76164, 41102  All mycotic toenails were reduced and debrided in length, width, and girth using a nail nipper and dremel.  All non-dystrphic nails also trimmed.    All hyperkeratotic skin lesion(s) if present were sharply pared with a scalpel / forcep with no bleeding or evidence of ulceration.    Patient tolerated procedure(s) well without complications.    Procedures     HPI:  Deion Wu is a 73 y.o.year old male seen for at risk foot exam and care.      The patient complained of thick toenails and painful lesions.  The patient has class findings with PVD.  Overall patient is clinically unchanged from prior visit.  The patient denied any acute pedal disorder, redness, acute swelling, or recent injury.      The following portions of the patient's history were reviewed and updated as appropriate: allergies, current medications, past family history, past medical history, past social history, past surgical history and problem list.    REVIEW OF SYSTEMS:  GENERAL: No fever or chills  HEART: No chest pain, or palpitation  RESPIRATORY:  No acute SOB or cough  GI: No Nausea, vomit or diarrhea  NEUROLOGIC: No syncope or acute weakness    PHYSICAL EXAM:    /74     VASCULAR EXAM  Posterior tibial artery  +1 bilateral.    Dorsalis pedis artery absent bilateral.  The patient has class findings with skin atrophy, lack of digital hair, and nail dystrophy.    There is no lower extremity edema bilaterally.    Venous stasis skin changes noted No BLE.    NEUROLOGIC EXAM  Sensation is intact to  light touch.  Sensation is intact to 10gm monofilament.  No focal neurologic deficit.          DERMATOLOGIC EXAM:   No ulcer or cellulitis noted.  Texture, Tone and Turgor are diminished? Yes  The patient has dystrophic/hypertrophic toenails with yellow/white discoloration, onycholysis, and subungal debris.   Fungal odor and brittle nature noted.  Pain with palpation.  Periungual erythema noted.  Right foot nails severely dystrophic x1 with 0.7 cm ave thickness (hallux)  Left foot nails dystrophic x5 with 0.4 cm ave thickness (1 through 5)  Patient has hyperkeratotic lesions noted:   Right foot at lateral hallux IPJ and medial 2nd PIPJ within 1st interspace.  Left foot at tip #3 toe  No notable suspicious skin lesions.      MUSCULOSKELETAL EXAM:   No acute joint pain, edema, or redness.  No acute musculoskeletal problem.    Patient has deformity including digital contractures.       Risk Category/Class Findings: Q8 (B2 ABC, B3)    A1)  Has the patient had a previous amputation of the foot or integral skeletal portion thereof? No  B1)  Does the patient have absent posterior tibial pulse? No  B3)  Does the patient have absent dorsalis pedis? Yes  B2)  Does the patient have three of the following? Yes           1.  Hair growth (increased or decreased), 2.  Nail changes (thickening) and 3.  Pigmentary changes (discoloring)  C)  Does the patient have two of the following and one above? No

## 2024-02-23 ENCOUNTER — OFFICE VISIT (OUTPATIENT)
Dept: OCCUPATIONAL THERAPY | Facility: CLINIC | Age: 74
End: 2024-02-23
Payer: COMMERCIAL

## 2024-02-23 DIAGNOSIS — R29.898 WEAKNESS OF LEFT UPPER EXTREMITY: Primary | ICD-10-CM

## 2024-02-23 PROCEDURE — 97112 NEUROMUSCULAR REEDUCATION: CPT | Performed by: OCCUPATIONAL THERAPIST

## 2024-02-23 NOTE — PROGRESS NOTES
Daily Note     Today's date: 2024  Patient name: Deion Wu  : 1950  MRN: [id number]  Referring provider: Colton Lees DO  Dx:   Encounter Diagnosis     ICD-10-CM    1. Weakness of left upper extremity  R29.898                      Subjective: I am okj       Objective: See treatment diary below      Assessment: Tolerated treatment fair. Patient  has improved shoulder flexion and elbow flexion in sidelie      Plan: Continue per plan of care.      Precautions:cervical laminectomy c3-6 8/15/23    [email or web address]  Access Code: [email or web address]        Manuals 2/23 1/3 1/18 1/23 1/25 1/30 2/6 2/8 2/16  RE                                                         HEP 3x day                                                                                                        Ther Ex             PROM L UE 5m 5m 2m          Wrist ext /rd 2#  3x10 #1 3x110 2#  3x10 2#  3x10 2#  3x10 2#  3x10 2#  3x10 2#  3x10  2#  3x10   Hammer sup Sm  3x10 SM 3x10 Sm  3x10 Sm  3x10 Sm  3x10 Sm  3x10 Sm  3x10  Sm  3x10      Flexbar twist /P/S  Red  3x10        Red  3x10 Red  3x10   Shoulder flex 0 gravity 3x10 3x10 3x10 3x10 3x10 3x10 3x10 3x10 3x10 3x10   Elbow flex 0 graviy 3x10  cuff 3x10 3x10 3x10 3x10 3x10 3x10 3x10  1#  Cuff  3x10   gripping   BOP RPW 2x30 RPW  2x30 RPW  2x30 BPW  3x10 GPW  3x10 GPW  3x10 BPW  3x10 BPW 3x10 BPW  3x10   UBE    / 5/5 3/3 4/4 4/4 3/3 4/4 3/3     Ther Activity             Flexbar twist                Red  3x10   Stress load      2m 2m 1m 1m      Webslide row Black  3x10  Green  3x10 Black  3x10 Black  3x10 Black  3x10 Black  3x10 Black  3x10 Black  3x10 Black  3x10   Webslide elbow ext shl ext Black'3x10  Black  3x10 Black  3x10 Black  3x10 Black  3x10 Black  3x10 Black  3x10 Black  3x10 Black  3x10   Flexbar P/S Red  3x10  Red  P/S Red  3x10 Red  3x10 Red  3x10  red  3x10 Green sup    Red pro   3x10 Red  3x10   Chest press   supine         2# 5#  3x10   Shld flex   supine  5#  Bar   3x10 3# dowel   3x10 3# dowel   3x10 3#  Dowel  3x10 5#  Dowel   3x10 5#  Dowel  3x10 5#  Dowel  3x10 5#  dowel 5#  dowel  3x10 5#  bar  3x10   Elbow flex in sidelie  ) gravity 3x10  1x10 3x10 3x10 3x10 3x10 3x10 3x10 3x10 3x10 3x10  1#

## 2024-02-27 ENCOUNTER — OFFICE VISIT (OUTPATIENT)
Dept: OCCUPATIONAL THERAPY | Facility: CLINIC | Age: 74
End: 2024-02-27
Payer: COMMERCIAL

## 2024-02-27 DIAGNOSIS — R29.898 WEAKNESS OF LEFT UPPER EXTREMITY: Primary | ICD-10-CM

## 2024-02-27 PROCEDURE — 97110 THERAPEUTIC EXERCISES: CPT | Performed by: OCCUPATIONAL THERAPIST

## 2024-02-27 PROCEDURE — 97112 NEUROMUSCULAR REEDUCATION: CPT | Performed by: OCCUPATIONAL THERAPIST

## 2024-02-27 NOTE — PROGRESS NOTES
Daily Note     Today's date: 2024  Patient name: Deion Wu  : 1950  MRN: [id number]  Referring provider: Colton Lees DO  Dx:   Encounter Diagnosis     ICD-10-CM    1. Weakness of left upper extremity  R29.898                      Subjective: I was able to brush my hair with my left yesterday      Objective: See treatment diary below      Assessment: Tolerated treatment well. Patient  has improved elbow flexion  . Muscle fatigue limits functional use       Plan: Continue per plan of care.      Precautions:cervical laminectomy c3-6 8/15/23    [email or web address]  Access Code: [email or web address]        Manuals   RE                                                         HEP 3x day                                                                                                        Ther Ex             PROM L UE 5m 5m 2m          Wrist ext /rd 2#  3x10 #1 3x110 2#  3x10 2#  3x10 2#  3x10 2#  3x10 2#  3x10 2#  3x10  2#  3x10   Hammer sup Sm  3x10 SM 3x10 Sm  3x10 Sm  3x10 Sm  3x10 Sm  3x10 Sm  3x10  Sm  3x10      Flexbar twist /P/S  Red  3x10        Red  3x10 Red  3x10   Shoulder flex 0 gravity 3x10 3x10 3x10 3x10 3x10 3x10 3x10 3x10 3x10 3x10   Elbow flex 0 graviy 3x10 3# cuff 3x10 3x10 3x10 3x10 3x10 3x10 3x10  1#  Cuff  3x10   gripping   BOP BPW 2x30 RPW  2x30 RPW  2x30 BPW  3x10 GPW  3x10 GPW  3x10 BPW  3x10 BPW 3x10 BPW  3x10   UBE    5/5 3/3 4/4 4/4 3/3 4/4 3/3     Ther Activity             Flexbar twist                Red  3x10   Stress load      2m 2m 1m 1m      Webslide row Black  3x10 Black  3x10 Green  3x10 Black  3x10 Black  3x10 Black  3x10 Black  3x10 Black  3x10 Black  3x10 Black  3x10   Webslide elbow ext shl ext Black'3x10 Black  3x10 Black  3x10 Black  3x10 Black  3x10 Black  3x10 Black  3x10 Black  3x10 Black  3x10 Black  3x10   Flexbar P/S Red  3x10 Green  3x10 Red  P/S Red  3x10 Red  3x10 Red  3x10  red  3x10 Green sup    Red pro    3x10 Red  3x10   Chest press   supine         2# 5#  3x10   Shld flex   supine 5#  Bar   3x10 5# dowel   3x10 3# dowel   3x10 3#  Dowel  3x10 5#  Dowel   3x10 5#  Dowel  3x10 5#  Dowel  3x10 5#  dowel 5#  dowel  3x10 5#  bar  3x10   Elbow flex in sidelie  ) gravity 3x10  1x10 3x10 3x10 3x10 3x10 3x10 3x10 3x10 3x10 3x10  1#

## 2024-02-29 ENCOUNTER — OFFICE VISIT (OUTPATIENT)
Dept: OCCUPATIONAL THERAPY | Facility: CLINIC | Age: 74
End: 2024-02-29
Payer: COMMERCIAL

## 2024-02-29 DIAGNOSIS — R29.898 WEAKNESS OF LEFT UPPER EXTREMITY: Primary | ICD-10-CM

## 2024-02-29 PROCEDURE — 97112 NEUROMUSCULAR REEDUCATION: CPT

## 2024-02-29 PROCEDURE — 97110 THERAPEUTIC EXERCISES: CPT

## 2024-02-29 NOTE — PROGRESS NOTES
Daily Note     Today's date: 2024  Patient name: Deion Wu  : 1950  MRN: [id number]  Referring provider: Colton Lees DO  Dx:   Encounter Diagnosis     ICD-10-CM    1. Weakness of left upper extremity  R29.898                      Subjective: I can get my hand to my mouth now and I was able to brush my teeth a little      Objective: See treatment diary below      Assessment: Tolerated treatment well. Patient exhibited good technique with therapeutic exercises. Patient reports only occasional pain, but still reports fatigue      Plan: Continue per plan of care.      Precautions:cervical laminectomy c3-6 8/15/23    [employer]  Access Code: [email or web address]        Manuals   RE                                                         HEP 3x day                                                                                                        Ther Ex             PROM L UE 5m 5m           Wrist ext /rd 2#  3x10 #1 3x110 1#  3x10 2#  3x10 2#  3x10 2#  3x10 2#  3x10 2#  3x10  2#  3x10   Hammer sup Sm  3x10 SM 3x10 Sm  3x10 Sm  3x10 Sm  3x10 Sm  3x10 Sm  3x10  Sm  3x10      Flexbar twist /P/S  Red  3x10        Red  3x10 Red  3x10   Shoulder flex 0 gravity 3x10 3x10 3x10 3x10 3x10 3x10 3x10 3x10 3x10 3x10   Elbow flex 0 graviy 3x10 3# cuff 3x10 3x10 3x10 3x10 3x10 3x10 3x10  1#  Cuff  3x10   gripping   BOP BPW 2x30 BPW  2x30 RPW  2x30 BPW  3x10 GPW  3x10 GPW  3x10 BPW  3x10 BPW 3x10 BPW  3x10   UBE     3/3 4/ 4/4 3/3 4/ 3/3     Ther Activity             Flexbar twist                Red  3x10   Stress load      2m 2m 1m 1m      Webslide row Black  3x10 Black  3x10 Black  3x10 Black  3x10 Black  3x10 Black  3x10 Black  3x10 Black  3x10 Black  3x10 Black  3x10   Webslide elbow ext shl ext Black'3x10 Black  3x10 Black  3x10 Black  3x10 Black  3x10 Black  3x10 Black  3x10 Black  3x10 Black  3x10 Black  3x10   Flexbar P/S Red  3x10 Green  3x10  Green  P/S Red  3x10 Red  3x10 Red  3x10  red  3x10 Green sup    Red pro   3x10 Red  3x10   Chest press   supine         2# 5#  3x10   Shld flex   supine 5#  Bar   3x10 5# dowel   3x10 3# dowel   3x10 3#  Dowel  3x10 5#  Dowel   3x10 5#  Dowel  3x10 5#  Dowel  3x10 5#  dowel 5#  dowel  3x10 5#  bar  3x10   Elbow flex in sidelie  ) gravity 3x10  1x10 3x10 3x10 3x10 3x10 3x10 3x10 3x10 3x10 3x10  1#

## 2024-03-05 ENCOUNTER — OFFICE VISIT (OUTPATIENT)
Dept: OCCUPATIONAL THERAPY | Facility: CLINIC | Age: 74
End: 2024-03-05
Payer: COMMERCIAL

## 2024-03-05 DIAGNOSIS — R29.898 WEAKNESS OF LEFT UPPER EXTREMITY: Primary | ICD-10-CM

## 2024-03-05 PROCEDURE — 97112 NEUROMUSCULAR REEDUCATION: CPT | Performed by: OCCUPATIONAL THERAPIST

## 2024-03-05 PROCEDURE — 97110 THERAPEUTIC EXERCISES: CPT | Performed by: OCCUPATIONAL THERAPIST

## 2024-03-07 ENCOUNTER — APPOINTMENT (OUTPATIENT)
Dept: OCCUPATIONAL THERAPY | Facility: CLINIC | Age: 74
End: 2024-03-07
Payer: COMMERCIAL

## 2024-03-19 ENCOUNTER — OFFICE VISIT (OUTPATIENT)
Dept: OCCUPATIONAL THERAPY | Facility: CLINIC | Age: 74
End: 2024-03-19
Payer: COMMERCIAL

## 2024-03-19 DIAGNOSIS — R29.898 WEAKNESS OF LEFT UPPER EXTREMITY: Primary | ICD-10-CM

## 2024-03-19 PROCEDURE — 97110 THERAPEUTIC EXERCISES: CPT | Performed by: OCCUPATIONAL THERAPIST

## 2024-03-19 PROCEDURE — 97112 NEUROMUSCULAR REEDUCATION: CPT | Performed by: OCCUPATIONAL THERAPIST

## 2024-03-19 NOTE — PROGRESS NOTES
Daily Note     Today's date: 3/19/2024  Patient name: Deion Wu  : 1950  MRN: [id number]  Referring provider: Colton Lees DO  Dx:   Encounter Diagnosis     ICD-10-CM    1. Weakness of left upper extremity  R29.898                      Subjective: I am doing better      Objective: See treatment diary below      Assessment: Tolerated treatment well. Patient  was able to remove hat today with L hand       Plan: Continue per plan of care.      Precautions:cervical laminectomy c3-6 8/15/23    [email or web address]  Access Code: [email or web address]        Manuals 2/23 2/27 2/29 3/5 3/19 1/30 2/6 2/8 2/16  RE                                                         HEP 3x day                                                                                                        Ther Ex             PROM L UE 5m 5m           Wrist ext /rd 2#  3x10 #1 3x110 1#  3x10 2#  3x10 2#  3x10 2#  3x10 2#  3x10 2#  3x10  2#  3x10   Hammer sup Sm  3x10 SM 3x10 Sm  3x10 Sm  3x10 Sm  3x10 Sm  3x10 Sm  3x10  Sm  3x10      Flexbar twist /P/S  Red  3x10        Red  3x10 Red  3x10   Shoulder flex 0 gravity 3x10 3x10 3x10 3x10 3x10 3x10 3x10 3x10 3x10 3x10   Elbow flex 0 graviy 3x10 3# cuff 3x10 3x10 3x10 3x10 3x10 3x10 3x10  1#  Cuff  3x10   gripping   BOP BPW 2x30 BPW  2x30 BPW  2x30 BPW  3x10 GPW  3x10 GPW  3x10 BPW  3x10 BPW 3x10 BPW  3x10   UBE     3/3 4/4 4/4 3/3 4/4 3/3     Ther Activity             Flexbar twist                Red  3x10   Stress load      2m 2m 1m 1m      Webslide row Black  3x10 Black  3x10 Black  3x10 Black  3x10 Black  3x10 Black  3x10 Black  3x10 Black  3x10 Black  3x10 Black  3x10   Webslide elbow ext shl ext Black'3x10 Black  3x10 Black  3x10 Black  3x10 Black  3x10 Black  3x10 Black  3x10 Black  3x10 Black  3x10 Black  3x10   Flexbar P/S Red  3x10 Green  3x10 Green  P/S G  3x10 Red  3x10 Red  3x10  red  3x10 Green sup    Red pro   3x10 Red  3x10   Chest press   supine    3x10 5#     2# 5#  3x10    Shld flex   supine 5#  Bar   3x10 5# dowel   3x10 3# dowel   3x10 5#  Dowel  3x10 5#  Dowel   3x10 6#  Dowel  3x10 5#  Dowel  3x10 5#  dowel 5#  dowel  3x10 5#  bar  3x10   Elbow flex in sidelie  ) gravity 3x10  1x10 3x10 3x10 3x10 3x10 3x10 3x10 3x10 3x10 3x10  1#

## 2024-03-22 ENCOUNTER — APPOINTMENT (OUTPATIENT)
Dept: OCCUPATIONAL THERAPY | Facility: CLINIC | Age: 74
End: 2024-03-22
Payer: COMMERCIAL

## 2024-03-25 ENCOUNTER — OFFICE VISIT (OUTPATIENT)
Dept: OCCUPATIONAL THERAPY | Facility: CLINIC | Age: 74
End: 2024-03-25
Payer: COMMERCIAL

## 2024-03-25 DIAGNOSIS — R29.898 WEAKNESS OF LEFT UPPER EXTREMITY: Primary | ICD-10-CM

## 2024-03-25 PROCEDURE — 97110 THERAPEUTIC EXERCISES: CPT | Performed by: OCCUPATIONAL THERAPIST

## 2024-03-25 PROCEDURE — 97112 NEUROMUSCULAR REEDUCATION: CPT | Performed by: OCCUPATIONAL THERAPIST

## 2024-03-25 NOTE — PROGRESS NOTES
Daily Note     Today's date: 3/25/2024  Patient name: Deion Wu  : 1950  MRN: [id number]  Referring provider: Colton Lees DO  Dx:   Encounter Diagnosis     ICD-10-CM    1. Weakness of left upper extremity  R29.898                      Subjective: Arm is getting stronger       Objective: See treatment diary below      Assessment: Tolerated treatment well. Patient  has improved has hand to mouth . Unable to reach over head.       Plan: Continue per plan of care.      Precautions:cervical laminectomy c3-6 8/15/23    [email or web address]  Access Code: [email or web address]        Manuals 2/23 2/27 2/29 3/5 3/19 3/25 2/6 2/8 2/16  RE                                                         HEP 3x day                                                                                                        Ther Ex             PROM L UE 5m 5m           Wrist ext /rd 2#  3x10 #1 3x110 1#  3x10 2#  3x10 2#  3x10   2#  3x10 2#  3x10  2#  3x10   Hammer sup Sm  3x10 SM 3x10 Sm  3x10 Sm  3x10 Sm  3x10  Sm  3x10  Sm  3x10      Flexbar twist /P/S  Red  3x10        Red  3x10 Red  3x10   Shoulder flex 0 gravity 3x10 3x10 3x10 3x10 3x10 3x10 3x10 3x10 3x10 3x10   Elbow flex 0 graviy 3x10 3# cuff 3x10 3x10 3x10 3x10 3x10  2#  cuff 3x10 3x10  1#  Cuff  3x10   gripping   BOP BPW 2x30 BPW  2x30 BPW  2x30 BPW  3x10 GPW  3x10 GPW  3x10 BPW  3x10 BPW 3x10 BPW  3x10   UBE     3/3 4/4 4/4 3/3 4/4 3/3     Ther Activity             Flexbar twist                Red  3x10   Stress load      2m 2m 1m 1m      Webslide row Black  3x10 Black  3x10 Black  3x10 Black  3x10 Black  3x10 Black  3x10 Black  3x10 Black  3x10 Black  3x10 Black  3x10   Webslide elbow ext shl ext Black'3x10 Black  3x10 Black  3x10 Black  3x10 Black  3x10 Black  3x10 Black  3x10 Black  3x10 Black  3x10 Black  3x10   Flexbar P/S Red  3x10 Green  3x10 Green  P/S G  3x10 Red  3x10 Red  3x10  red  3x10 Green sup    Red pro   3x10 Red  3x10   Chest press   supine     3x107#     2# 5#  3x10   Shld flex   supine 5#  Bar   3x10 5# dowel   3x10 3# dowel   3x10 5#  Dowel  3x10 5#  Dowel   3x10 6#  Dowel  3x10 5#  Dowel  3x10 5#  dowel 5#  dowel  3x10 5#  bar  3x10   Elbow flex in sidelie  ) gravity 3x10  1x10 3x10 3x10 3x10 3x10 3x10 3x10 3x10 3x10 3x10  1#

## 2024-03-28 ENCOUNTER — APPOINTMENT (OUTPATIENT)
Dept: OCCUPATIONAL THERAPY | Facility: CLINIC | Age: 74
End: 2024-03-28
Payer: COMMERCIAL

## 2024-04-01 ENCOUNTER — OFFICE VISIT (OUTPATIENT)
Dept: OCCUPATIONAL THERAPY | Facility: CLINIC | Age: 74
End: 2024-04-01
Payer: COMMERCIAL

## 2024-04-01 DIAGNOSIS — R29.898 WEAKNESS OF LEFT UPPER EXTREMITY: Primary | ICD-10-CM

## 2024-04-01 PROCEDURE — 97112 NEUROMUSCULAR REEDUCATION: CPT | Performed by: OCCUPATIONAL THERAPIST

## 2024-04-01 NOTE — PROGRESS NOTES
Daily Note     Today's date: 2024  Patient name: Deion Wu  : 1950  MRN: [id number]  Referring provider: Colton Lees DO  Dx:   Encounter Diagnosis     ICD-10-CM    1. Weakness of left upper extremity  R29.898                      Subjective: I was able to eat to eat with my L hand yesterday       Objective: See treatment diary below      Assessment: Tolerated treatment well. Patient  has improved L elbow function  for bringing hand to mouth       Plan: Continue per plan of care.      Precautions:cervical laminectomy c3-6 8/15/23    [email or web address]  Access Code: [email or web address]        Manuals 2/23 2/27 2/29 3/5 3/19 3/25 4/1 2/8 2/16  RE                                                         HEP 3x day                                                                                                        Ther Ex             PROM L UE 5m 5m            Wrist ext /rd 2#  3x10 #1 3x110 1#  3x10 2#  3x10 2#  3x10    2#  3x10  2#  3x10   Hammer sup Sm  3x10 SM 3x10 Sm  3x10 Sm  3x10 Sm  3x10     Sm  3x10      Flexbar twist /P/S  Red  3x10      Green  3x10  Red  3x10 Red  3x10   Shoulder flex 0 gravity 3x10 3x10 3x10 3x10 3x10 3x10 3x10 3x10 3x10 3x10   Elbow flex   3x10 3# cuff 3x10 3x10 3x10 3x10 3x10  2#  cuff 3x10 3x10  1#  Cuff  3x10   gripping   BOP BPW 2x30 BPW  2x30 BPW  2x30 BPW  3x10 GPW  3x10   BPW  3x10 BPW 3x10 BPW  3x10   UBE     3/3 4/4 4/4 4/4 4/4 3/3     Ther Activity             Ffxnl reach in standing cones waist to shoulder           30x    Red  3x10   Stress load      2m 2m 1m 1m      Webslide row Black  3x10 Black  3x10 Black  3x10 Black  3x10 Black  3x10 Black  3x10 Black  3x10 Black  3x10 Black  3x10 Black  3x10   Webslide elbow ext shl ext Black'3x10 Black  3x10 Black  3x10 Black  3x10 Black  3x10 Black  3x10 Black  3x10 Black  3x10 Black  3x10 Black  3x10   Flexbar P/S Red  3x10 Green  3x10 Green  P/S G  3x10 Red  3x10 Red  3x10 Green  3x10 red  3x10 Green  sup    Red pro   3x10 Red  3x10   Chest press   supine    3x107#     2# 5#  3x10   Shld flex   sidelie 5#  Bar   3x10 5# dowel   3x10 3# dowel   3x10 5#  Dowel  3x10 5#  Dowel   3x10 6#  Dowel  3x10 5#  cuff  3x10 5#  dowel 5#  dowel  3x10 5#  bar  3x10   Elbow flex in sidelie  ) gravity 3x10  1x10 3x10 3x10 3x10 3x10 3x10 3x10 3x10 3x10 3x10  1#

## 2024-04-02 ENCOUNTER — APPOINTMENT (OUTPATIENT)
Dept: OCCUPATIONAL THERAPY | Facility: CLINIC | Age: 74
End: 2024-04-02
Payer: COMMERCIAL

## 2024-04-04 ENCOUNTER — APPOINTMENT (OUTPATIENT)
Dept: OCCUPATIONAL THERAPY | Facility: CLINIC | Age: 74
End: 2024-04-04
Payer: COMMERCIAL

## 2024-05-10 ENCOUNTER — OFFICE VISIT (OUTPATIENT)
Dept: PODIATRY | Facility: CLINIC | Age: 74
End: 2024-05-10
Payer: COMMERCIAL

## 2024-05-10 VITALS
SYSTOLIC BLOOD PRESSURE: 107 MMHG | DIASTOLIC BLOOD PRESSURE: 73 MMHG | HEIGHT: 72 IN | HEART RATE: 62 BPM | BODY MASS INDEX: 22.62 KG/M2 | WEIGHT: 167 LBS

## 2024-05-10 DIAGNOSIS — I73.9 PERIPHERAL VASCULAR DISEASE, UNSPECIFIED (HCC): ICD-10-CM

## 2024-05-10 DIAGNOSIS — B35.1 ONYCHOMYCOSIS: Primary | ICD-10-CM

## 2024-05-10 DIAGNOSIS — L84 CORNS AND CALLUS: ICD-10-CM

## 2024-05-10 PROCEDURE — 11056 PARNG/CUTG B9 HYPRKR LES 2-4: CPT | Performed by: PODIATRIST

## 2024-05-10 PROCEDURE — 11721 DEBRIDE NAIL 6 OR MORE: CPT | Performed by: PODIATRIST

## 2024-05-12 NOTE — PROGRESS NOTES
PATIENT:  Deion Wu  1950    ASSESSMENT:  1. Onychomycosis        2. Corns and callus        3. Peripheral vascular disease, unspecified (HCC)              No orders of the defined types were placed in this encounter.       PLAN:  The patient was educated in proper foot wear.  Also discussed daily foot assessment and proper foot care.    The patient will follow up in 9-12 weeks for foot exam and care.      Procedures: 80665, 46791  All mycotic toenails were reduced and debrided in length, width, and girth using a nail nipper and dremel.  All non-dystrphic nails also trimmed.    All hyperkeratotic skin lesion(s) if present were sharply pared with a scalpel / forcep with no bleeding or evidence of ulceration.    Patient tolerated procedure(s) well without complications.    Procedures     HPI:  Deion Wu is a 73 y.o.year old male seen for at risk foot exam and care.      The patient complained of thick toenails and painful lesions.  The patient has class findings with PVD.  Overall patient is clinically unchanged from prior visit.  The patient denied any acute pedal disorder, redness, acute swelling, or recent injury.      The following portions of the patient's history were reviewed and updated as appropriate: allergies, current medications, past family history, past medical history, past social history, past surgical history and problem list.    REVIEW OF SYSTEMS:  GENERAL: No fever or chills  HEART: No chest pain, or palpitation  RESPIRATORY:  No acute SOB or cough  GI: No Nausea, vomit or diarrhea  NEUROLOGIC: No syncope or acute weakness    PHYSICAL EXAM:    /73 (BP Location: Left arm, Patient Position: Sitting, Cuff Size: Adult)   Pulse 62   Ht 6' (1.829 m) Comment: stated  Wt 75.8 kg (167 lb)   BMI 22.65 kg/m²     VASCULAR EXAM  Posterior tibial artery  +1 bilateral.    Dorsalis pedis artery absent bilateral.  The patient has class findings with skin atrophy, lack of digital hair,  and nail dystrophy.    There is no lower extremity edema bilaterally.    Venous stasis skin changes noted No BLE.    NEUROLOGIC EXAM  Sensation is intact to light touch.  Sensation is intact to 10gm monofilament.  No focal neurologic deficit.          DERMATOLOGIC EXAM:   No ulcer or cellulitis noted.  Texture, Tone and Turgor are diminished? Yes  The patient has dystrophic/hypertrophic toenails with yellow/white discoloration, onycholysis, and subungal debris.   Fungal odor and brittle nature noted.  Pain with palpation.  Periungual erythema noted.  Right foot nails severely dystrophic x1 with 0.7 cm ave thickness (hallux)  Left foot nails dystrophic x5 with 0.4 cm ave thickness (1 through 5)  Patient has hyperkeratotic lesions noted:   Right foot at lateral hallux IPJ and medial 2nd PIPJ within 1st interspace.  Left foot at tip #3 toe  No notable suspicious skin lesions.      MUSCULOSKELETAL EXAM:   No acute joint pain, edema, or redness.  No acute musculoskeletal problem.    Patient has deformity including digital contractures.       Risk Category/Class Findings: Q8 (B2 ABC, B3)    A1)  Has the patient had a previous amputation of the foot or integral skeletal portion thereof? No  B1)  Does the patient have absent posterior tibial pulse? No  B3)  Does the patient have absent dorsalis pedis? Yes  B2)  Does the patient have three of the following? Yes           1.  Hair growth (increased or decreased), 2.  Nail changes (thickening) and 3.  Pigmentary changes (discoloring)  C)  Does the patient have two of the following and one above? No

## 2024-07-03 ENCOUNTER — OFFICE VISIT (OUTPATIENT)
Dept: DERMATOLOGY | Facility: CLINIC | Age: 74
End: 2024-07-03
Payer: COMMERCIAL

## 2024-07-03 VITALS — TEMPERATURE: 98.4 F

## 2024-07-03 DIAGNOSIS — L20.89 OTHER ATOPIC DERMATITIS: ICD-10-CM

## 2024-07-03 DIAGNOSIS — T14.8XXA OPEN WOUND: Primary | ICD-10-CM

## 2024-07-03 DIAGNOSIS — L81.4 LENTIGINES: ICD-10-CM

## 2024-07-03 DIAGNOSIS — L21.9 SEBORRHEIC DERMATITIS: ICD-10-CM

## 2024-07-03 DIAGNOSIS — D22.9 MULTIPLE BENIGN MELANOCYTIC NEVI: ICD-10-CM

## 2024-07-03 PROCEDURE — 17110 DESTRUCTION B9 LES UP TO 14: CPT | Performed by: STUDENT IN AN ORGANIZED HEALTH CARE EDUCATION/TRAINING PROGRAM

## 2024-07-03 PROCEDURE — 99204 OFFICE O/P NEW MOD 45 MIN: CPT | Performed by: STUDENT IN AN ORGANIZED HEALTH CARE EDUCATION/TRAINING PROGRAM

## 2024-07-03 RX ORDER — TRIAMCINOLONE ACETONIDE 1 MG/G
CREAM TOPICAL
Qty: 30 G | Refills: 0 | Status: SHIPPED | OUTPATIENT
Start: 2024-07-03

## 2024-07-03 NOTE — PATIENT INSTRUCTIONS
"SOLAR PURPURA       Assessment and Plan:  Based on a thorough discussion of this condition and the management approach to it (including a comprehensive discussion of the known risks, side effects and potential benefits of treatment), the patient (family) agrees to implement the following specific plan:  Start mupirocin ointment mixed with Vaseline to help prevent infection     NEUROFIBROMA      Assessment and Plan:  Based on a thorough discussion of this condition and the management approach to it (including a comprehensive discussion of the known risks, side effects and potential benefits of treatment), the patient (family) agrees to implement the following specific plan:  No treatment needed    VERRUCA VULGARIS (\"Common Warts\")      Assessment and Plan:     - We discussed that warts are caused by a virus  - We discussed treatment options including topical salicylic acid, destructive modalities, removal, topical immunotherapy, and observation, including risks, benefits, and alternatives  - Based on a thorough discussion of this condition and the management approach to it (including a comprehensive discussion of the known risks, side effects and potential benefits of treatment), the patient (family) agrees to implement the following specific plan:    Cryotherapy done in office today             OTHER ATOPIC DERMATITIS    Assessment and Plan:  Based on a thorough discussion of this condition and the management approach to it (including a comprehensive discussion of the known risks, side effects and potential benefits of treatment), the patient (family) agrees to implement the following specific plan:  Start triamcinolone cream on affected area    SEBORRHEIC DERMATITIS      Assessment and Plan:  History and physical exam most consistent with seborrheic dermatitis  The chronic nature of this condition and association with normal skin yeast were reviewed.   Educated that the goal of therapy is to control symptoms, but " this is not typically something we cure and intermittent flares can be expected.  No treatment needed     SEBORRHEIC KERATOSES  - Relevant exam: Scattered over the trunk/extremities are waxy brown to black plaques and papules with stuck on appearance  - Exam and clinical history consistent with seborrheic keratoses  - Counseled that these are benign growths that do not require treatment  - Counseled that removal of lesions is considered cosmetic and so would incur a fee should patient elect to move forward.   - Patient to hold on treatments for now but will inform us should they desire additional treatments    MELANOCYTIC NEVI  -Relevant exam: Scattered over the trunk/extremities are homogenously pigmented brown macules and papules. ELM performed and without concerning findings.  - Exam and clinical history consistent with melanocytic nevi  - Educated on the ABCDE's of melanoma; handout provided  - Counseled to return to clinic prior to scheduled appointment should any of these lesions change or should any new lesions of concern arise  - Counseled on use of sun protection daily. Reviewed latest FDA sunscreen guidelines, including use of broad spectrum (UVA and UVB blocking) sunscreen or sun protective clothing with SPF 30-50 every 2-3 hours and reapplied after exposure to water; use of photoprotective clothing, including a broad brim hat and UPF rated clothing if outdoors for several hours; avoid use of tanning beds as these pose significant risk for melanoma and skin cancer.    LENTIGINES  OTHER SKIN CHANGES DUE TO CHRONIC EXPOSURE TO NONIONIZING RADIATION  - Relevant exam: Over sun exposed areas are brown macules. ELM performed and without concerning findings.  - Exam and clinical history consistent with lentigines.  - Educated that these are indicative of prior sun exposure.   - Counseled to return to clinic prior to scheduled appointment should any of these lesions change or should any new lesions of concern  arise.  - Recommended use of sunscreen as above and below.  - Counseled on use of sun protection daily. Reviewed latest FDA sunscreen guidelines, including use of broad spectrum (UVA and UVB blocking) sunscreen or sun protective clothing with SPF 30-50 every 2-3 hours and reapplied after exposure to water; use of photoprotective clothing, including a broad brim hat and UPF rated clothing if outdoors for several hours; avoid use of tanning beds as these pose significant risk for melanoma and skin cancer.

## 2024-07-03 NOTE — PROGRESS NOTES
"[facility] Dermatology Clinic Note     Patient Name: Deion Wu  Encounter Date: 7/3/24     Have you been cared for by a [facility] Dermatologist in the last 3 years and, if so, which description applies to you?    NO.   I am considered a \"new\" patient and must complete all patient intake questions. I am MALE/not capable of bearing children.    REVIEW OF SYSTEMS:  Have you recently had or currently have any of the following? Recent fever or chills? No  Any non-healing wound? YES, on the forearm    PAST MEDICAL HISTORY:  Have you personally ever had or currently have any of the following?  If \"YES,\" then please provide more detail. Skin cancer (such as Melanoma, Basal Cell Carcinoma, Squamous Cell Carcinoma?  No  Tuberculosis, HIV/AIDS, Hepatitis B or C: No  Radiation Treatment No   HISTORY OF IMMUNOSUPPRESSION:   Do you have a history of any of the following:  Systemic Immunosuppression such as Diabetes, Biologic or Immunotherapy, Chemotherapy, Organ Transplantation, Bone Marrow Transplantation?  No     Answering \"YES\" requires the addition of the dotphrase \"IMMUNOSUPPRESSED\" as the first diagnosis of the patient's visit.   FAMILY HISTORY:  Any \"first degree relatives\" (parent, brother, sister, or child) with the following?    Skin Cancer, Pancreatic or Other Cancer? No   PATIENT EXPERIENCE:    Do you want the Dermatologist to perform a COMPLETE skin exam today including a clinical examination under the \"bra and underwear\" areas?  Yes  If necessary, do we have your permission to call and leave a detailed message on your Preferred Phone number that includes your specific medical information?  Yes      No Known Allergies   Current Outpatient Medications:     acetaminophen (TYLENOL) 325 mg tablet, 3 tabs Orally q 8 hrs PRN, Disp: , Rfl:     apixaban (Eliquis) 5 mg, Every 12 hours, Disp: , Rfl:     escitalopram (LEXAPRO) 10 mg tablet, Take 10 mg by mouth daily, Disp: , Rfl:     furosemide (LASIX) 20 mg tablet, " "every 24 hours, Disp: , Rfl:     metoprolol succinate (TOPROL-XL) 25 mg 24 hr tablet, TAKE 1 TABLET BY MOUTH ONCE DAILY IN EVENING OR BEDTIME, Disp: , Rfl:     multivitamin (THERAGRAN) TABS, Take 1 tablet by mouth daily, Disp: , Rfl:     timolol (TIMOPTIC) 0.5 % ophthalmic solution, every 24 hours, Disp: , Rfl:           Whom besides the patient is providing clinical information about today's encounter?   NO ADDITIONAL HISTORIAN (patient alone provided history)    Physical Exam and Assessment/Plan by Diagnosis:    SOLAR PURPURA   Physical Exam:  Anatomic Location Affected:  bilateral forearms  Morphological Description:  purpura with areas of erosion  Pertinent Positives:  Pertinent Negatives: No significant erythema pain or swelling    Additional History of Present Condition:  Patient presents with bruising     Assessment and Plan:  Based on a thorough discussion of this condition and the management approach to it (including a comprehensive discussion of the known risks, side effects and potential benefits of treatment), the patient (family) agrees to implement the following specific plan:  Start mupirocin ointment mixed with Vaseline to help prevent infection to open areas  Continue good sun protection    NEUROFIBROMA    Physical Exam:  Anatomic Location Affected:  left shoulder  Morphological Description:  pedunculated skin colored soft papules  Pertinent Positives:  Pertinent Negatives:    Additional History of Present Condition:  Patient reports that his wife pointed out this spot. He denies any changing or pain.    Assessment and Plan:  Based on a thorough discussion of this condition and the management approach to it (including a comprehensive discussion of the known risks, side effects and potential benefits of treatment), the patient (family) agrees to implement the following specific plan:  Reassured benign    VERRUCA VULGARIS (\"Common Warts\")    Physical Exam:  Anatomic Location Affected:  Right temple  "   Morphological Description:   skin colored verrucous papules  Pertinent Positives:  Pertinent Negatives:      Assessment and Plan:     - We discussed that warts are caused by a virus  - We discussed treatment options including topical salicylic acid, destructive modalities, removal, topical immunotherapy, and observation, including risks, benefits, and alternatives  - Based on a thorough discussion of this condition and the management approach to it (including a comprehensive discussion of the known risks, side effects and potential benefits of treatment), the patient (family) agrees to implement the following specific plan:      PROCEDURE:  DESTRUCTION OF BENIGN LESIONS WITH CRYOTHERAPY  After a thorough discussion of treatment options and risk/benefits/alternatives (including but not limited to local pain, scarring, dyspigmentation, blistering, and possible superinfection), verbal and written consent were obtained and the aforementioned lesions were treated on with cryotherapy using liquid nitrogen x 1 cycle for 5-10 seconds.    TOTAL NUMBER of 1 lesions were treated today on the ANATOMIC LOCATION: right temple.    The patient tolerated the procedure well, and after-care instructions were provided.           OTHER ATOPIC DERMATITIS  Physical Exam:  Anatomic Location Affected:  mid lower back  Morphological Description:  xerotic pink plaque  Pertinent Positives:  Pertinent Negatives:    Additional History of Present Condition:  patient had noticed an itchy spot on his lower back.    Assessment and Plan:  Based on a thorough discussion of this condition and the management approach to it (including a comprehensive discussion of the known risks, side effects and potential benefits of treatment), the patient (family) agrees to implement the following specific plan:  Start triamcinolone cream on affected area    SEBORRHEIC DERMATITIS     Physical Exam:  Anatomic Location Affected &  Morphological Description:  Greasy  adherent scale/scaling plaques on central chest  Pertinent Positives:  Pertinent Negatives:      Assessment and Plan:  History and physical exam most consistent with seborrheic dermatitis  The chronic nature of this condition and association with normal skin yeast were reviewed.   Educated that the goal of therapy is to control symptoms, but this is not typically something we cure and intermittent flares can be expected.  No treatment desired at this time. Will be in touch as needed     SEBORRHEIC KERATOSES  - Relevant exam: Scattered over the trunk/extremities are waxy brown to black plaques and papules with stuck on appearance  - Exam and clinical history consistent with seborrheic keratoses  - Counseled that these are benign growths that do not require treatment  - Counseled that removal of lesions is considered cosmetic and so would incur a fee should patient elect to move forward.   - Patient to hold on treatments for now but will inform us should they desire additional treatments    MELANOCYTIC NEVI  -Relevant exam: Scattered over the trunk/extremities are homogenously pigmented brown macules and papules. ELM performed and without concerning findings.  - Exam and clinical history consistent with melanocytic nevi  - Educated on the ABCDE's of melanoma; handout provided  - Counseled to return to clinic prior to scheduled appointment should any of these lesions change or should any new lesions of concern arise  - Counseled on use of sun protection daily. Reviewed latest FDA sunscreen guidelines, including use of broad spectrum (UVA and UVB blocking) sunscreen or sun protective clothing with SPF 30-50 every 2-3 hours and reapplied after exposure to water; use of photoprotective clothing, including a broad brim hat and UPF rated clothing if outdoors for several hours; avoid use of tanning beds as these pose significant risk for melanoma and skin cancer.    LENTIGINES  OTHER SKIN CHANGES DUE TO CHRONIC EXPOSURE TO  NONIONIZING RADIATION  - Relevant exam: Over sun exposed areas are brown macules. ELM performed and without concerning findings.  - Exam and clinical history consistent with lentigines.  - Educated that these are indicative of prior sun exposure.   - Counseled to return to clinic prior to scheduled appointment should any of these lesions change or should any new lesions of concern arise.  - Recommended use of sunscreen as above and below.  - Counseled on use of sun protection daily. Reviewed latest FDA sunscreen guidelines, including use of broad spectrum (UVA and UVB blocking) sunscreen or sun protective clothing with SPF 30-50 every 2-3 hours and reapplied after exposure to water; use of photoprotective clothing, including a broad brim hat and UPF rated clothing if outdoors for several hours; avoid use of tanning beds as these pose significant risk for melanoma and skin cancer.      Scribe Attestation      I,:  Susan Troncoso am acting as a scribe while in the presence of the attending physician.:       I,:  Muaro Abernathy MD personally performed the services described in this documentation    as scribed in my presence.:

## 2024-08-15 ENCOUNTER — OFFICE VISIT (OUTPATIENT)
Dept: PODIATRY | Facility: CLINIC | Age: 74
End: 2024-08-15
Payer: COMMERCIAL

## 2024-08-15 VITALS
HEIGHT: 72 IN | BODY MASS INDEX: 23.03 KG/M2 | SYSTOLIC BLOOD PRESSURE: 105 MMHG | DIASTOLIC BLOOD PRESSURE: 55 MMHG | WEIGHT: 170 LBS

## 2024-08-15 DIAGNOSIS — L97.512 SKIN ULCER OF GREAT TOE, RIGHT, WITH FAT LAYER EXPOSED (HCC): Primary | ICD-10-CM

## 2024-08-15 DIAGNOSIS — L84 CORNS AND CALLUS: ICD-10-CM

## 2024-08-15 DIAGNOSIS — B35.1 ONYCHOMYCOSIS: ICD-10-CM

## 2024-08-15 DIAGNOSIS — I73.9 PERIPHERAL VASCULAR DISEASE, UNSPECIFIED (HCC): ICD-10-CM

## 2024-08-15 PROCEDURE — 11042 DBRDMT SUBQ TIS 1ST 20SQCM/<: CPT | Performed by: PODIATRIST

## 2024-08-15 PROCEDURE — 11055 PARING/CUTG B9 HYPRKER LES 1: CPT | Performed by: PODIATRIST

## 2024-08-15 PROCEDURE — 11721 DEBRIDE NAIL 6 OR MORE: CPT | Performed by: PODIATRIST

## 2024-08-15 RX ORDER — FLECAINIDE ACETATE 100 MG/1
TABLET ORAL EVERY 12 HOURS
COMMUNITY

## 2024-08-15 NOTE — PROGRESS NOTES
"  PATIENT:  Deion Wu  1950    ASSESSMENT:  1. Skin ulcer of great toe, right, with fat layer exposed (HCC)  Debridement Other (comment) (Interdigital) Anterior;Right Toe D1, great      2. Onychomycosis        3. Corns and callus        4. Peripheral vascular disease, unspecified (HCC)                Orders Placed This Encounter   Procedures   • Debridement Other (comment) (Interdigital) Anterior;Right Toe D1, great          PLAN:  The patient was educated in proper foot wear.  Also discussed daily foot assessment and proper foot care.    Betadine/alginate dressing applied right great toe ulceration to be changed QOD.  Patient instructed on appropriate bandaging technique and dispensed instructions.  Recommend patient follow-up in 2 weeks for ulcer check and then in 12 weeks for regular footcare.    Procedures: 86307, 78753  All mycotic toenails were reduced and debrided in length, width, and girth using a nail nipper and dremel.  All non-dystrphic nails also trimmed.    All hyperkeratotic skin lesion(s) if present were sharply pared with a scalpel / forcep with no bleeding or evidence of ulceration.    Patient tolerated procedure(s) well without complications.    Debridement   Wound 08/15/24 Other (comment) Toe D1, great Anterior;Right    Universal Protocol:  procedure performed by consultantConsent: Verbal consent obtained.  Risks and benefits: risks, benefits and alternatives were discussed  Consent given by: patient  Time out: Immediately prior to procedure a \"time out\" was called to verify the correct patient, procedure, equipment, support staff and site/side marked as required.  Patient understanding: patient states understanding of the procedure being performed  Patient identity confirmed: verbally with patient    Debridement Details  Performed by: physician  Debridement type: surgical  Level of debridement: subcutaneous tissue  Pain control: lidocaine 4%      Post-debridement measurements  Length " (cm): 0.8  Width (cm): 0.8  Depth (cm): 0.2  Percent debrided: 100%  Surface Area (cm^2): 0.64  Area Debrided (cm^2): 0.64  Volume (cm^3): 0.13    Tissue and other material debrided: subcutaneous tissue  Instrument(s) utilized: nippers, curette and blade  Technique utilized: excisionalBleeding: medium  Hemostasis obtained with: pressure  Procedural pain (0-10): 3  Post-procedural pain: 3   Response to treatment: procedure was tolerated well         HPI:  Deion Wu is a 73 y.o.year old male seen for at risk foot exam and care.    Patient is concerned with thick toenails and painful lesions within first interspace right foot.  The patient has class findings with PVD.  Overall patient is clinically unchanged from prior visit except for new ulcerative breakdown with malodor right great toe.  Patient does not recall nor did he notice how long this has been present since he does not feel very well.  The patient denied any acute pedal disorder, redness, acute swelling, or recent injury.      The following portions of the patient's history were reviewed and updated as appropriate: allergies, current medications, past family history, past medical history, past social history, past surgical history and problem list.    REVIEW OF SYSTEMS:  GENERAL: No fever or chills  HEART: No chest pain, or palpitation  RESPIRATORY:  No acute SOB or cough  GI: No Nausea, vomit or diarrhea  NEUROLOGIC: No syncope or acute weakness    PHYSICAL EXAM:    /55 (BP Location: Left arm, Patient Position: Sitting, Cuff Size: Adult)   Ht 6' (1.829 m) Comment: stated  Wt 77.1 kg (170 lb)   BMI 23.06 kg/m²     VASCULAR EXAM  Posterior tibial artery  +1 bilateral.    Dorsalis pedis artery absent bilateral.  The patient has class findings with skin atrophy, lack of digital hair, and nail dystrophy.    There is no lower extremity edema bilaterally.    Venous stasis skin changes noted No BLE.    NEUROLOGIC EXAM  Diminished sensation to light  touch.  Sensation is intact to 10gm monofilament.  Vibratory diminished.  No focal neurologic deficit.          DERMATOLOGIC EXAM:   No ulcer or cellulitis noted.  Texture, Tone and Turgor are diminished? Yes  The patient has dystrophic/hypertrophic toenails with yellow/white discoloration, onycholysis, and subungal debris.   Fungal odor and brittle nature noted.  Pain with palpation.  Periungual erythema noted.  Right foot nails severely dystrophic x1 with 0.7 cm ave thickness (hallux)  Left foot nails dystrophic x5 with 0.4 cm ave thickness (1 through 5)    Patient has hyperkeratotic lesions noted:   Right foot at medial 2nd PIPJ within 1st interspace.  Left foot at tip #3 toe    Ulcerations/wounds:.    Full-thickness necrotic ulcer lateral aspect of right great toe IPJ.   Ulcer has hyperkeratotic margin with fat layer exposed, macerated, and malodor noted.  No cellulitis or signs of ascending infection.  No lymphangitis.  Wound measures 0.8 x 0.8 x 0.2 cm, no pain with palpation.    MUSCULOSKELETAL EXAM:   No acute joint pain, edema, or redness.  No acute musculoskeletal problem.    Patient has deformity including digital contractures.       Risk Category/Class Findings: Q8 (B2 ABC, B3)    A1)  Has the patient had a previous amputation of the foot or integral skeletal portion thereof? No  B1)  Does the patient have absent posterior tibial pulse? No  B3)  Does the patient have absent dorsalis pedis? Yes  B2)  Does the patient have three of the following? Yes           1.  Hair growth (increased or decreased), 2.  Nail changes (thickening) and 3.  Pigmentary changes (discoloring)  C)  Does the patient have two of the following and one above? No

## 2024-08-30 ENCOUNTER — OFFICE VISIT (OUTPATIENT)
Dept: PODIATRY | Facility: CLINIC | Age: 74
End: 2024-08-30
Payer: COMMERCIAL

## 2024-08-30 VITALS
WEIGHT: 173 LBS | DIASTOLIC BLOOD PRESSURE: 84 MMHG | BODY MASS INDEX: 23.43 KG/M2 | SYSTOLIC BLOOD PRESSURE: 122 MMHG | HEART RATE: 55 BPM | HEIGHT: 72 IN

## 2024-08-30 DIAGNOSIS — M20.41 HAMMER TOE OF RIGHT FOOT: ICD-10-CM

## 2024-08-30 DIAGNOSIS — L97.512 SKIN ULCER OF GREAT TOE, RIGHT, WITH FAT LAYER EXPOSED (HCC): Primary | ICD-10-CM

## 2024-08-30 DIAGNOSIS — M20.11 HALLUX VALGUS OF RIGHT FOOT: ICD-10-CM

## 2024-08-30 PROCEDURE — 11042 DBRDMT SUBQ TIS 1ST 20SQCM/<: CPT | Performed by: PODIATRIST

## 2024-08-30 RX ORDER — MUPIROCIN 20 MG/G
OINTMENT TOPICAL EVERY OTHER DAY
Qty: 22 G | Refills: 0 | Status: SHIPPED | OUTPATIENT
Start: 2024-08-30

## 2024-08-30 NOTE — PROGRESS NOTES
Patient ID: Deion Wu is a 73 y.o. male Date of Birth 1950       Chief Complaint   Patient presents with   • Wound     Ulcer of great right toe        Allergies:  Patient has no known allergies.    Diagnosis:  1. Skin ulcer of great toe, right, with fat layer exposed (HCC)  -     mupirocin (BACTROBAN) 2 % ointment; Apply topically every other day With alginate dressing  -     Debridement Other (comment) (Interdigital) Anterior;Right;Lateral Toe D1, great  2. Hallux valgus of right foot  3. Hammer toe of right foot     Diagnosis ICD-10-CM Associated Orders   1. Skin ulcer of great toe, right, with fat layer exposed (HCC)  L97.512 mupirocin (BACTROBAN) 2 % ointment     Debridement Other (comment) (Interdigital) Anterior;Right;Lateral Toe D1, great      2. Hallux valgus of right foot  M20.11       3. Hammer toe of right foot  M20.41            Assessment & Plan:  Continue to bandage every other day with alginate and mupirocin ointment.  Excisional debridement of ulceration full-thickness right great toe as noted below  Dry sterile dressing with mupirocin/Adaptic applied to right great toe  Follow-up in 3 weeks.  Call if any signs of infection are noted.    Subjective:   8/30/2024: 73-year-old male seen today for follow-up evaluation of full-thickness ulceration right great toe.  Reports it does feel better and is no longer is tender.  Denies any fever or shortness of breath.    8/15/2024: 73 y.o.year old male seen for at risk foot exam and care.    Patient is concerned with thick toenails and painful lesions within first interspace right foot.  The patient has class findings with PVD.  Overall patient is clinically unchanged from prior visit except for new ulcerative breakdown with malodor right great toe.  Patient does not recall nor did he notice how long this has been present since he does not feel very well.  The patient denied any acute pedal disorder, redness, acute swelling, or recent injury.           The following portions of the patient's history were reviewed and updated as appropriate:   There is no problem list on file for this patient.    Past Medical History:   Diagnosis Date   • Atrial fibrillation (HCC)      Past Surgical History:   Procedure Laterality Date   • ADENOIDECTOMY     • APPENDECTOMY     • TONSILECTOMY AND ADNOIDECTOMY     • TOTAL SHOULDER REPLACEMENT Bilateral L - ; R -      Social History     Socioeconomic History   • Marital status: /Civil Union     Spouse name: None   • Number of children: None   • Years of education: None   • Highest education level: None   Occupational History   • None   Tobacco Use   • Smoking status: Former     Current packs/day: 0.00     Average packs/day: 0.5 packs/day for 10.0 years (5.0 ttl pk-yrs)     Types: Cigarettes, Pipe     Start date: 1972     Quit date: 1982     Years since quittin.8     Passive exposure: Past   • Smokeless tobacco: Never   Vaping Use   • Vaping status: Never Used   Substance and Sexual Activity   • Alcohol use: Yes     Alcohol/week: 3.0 standard drinks of alcohol     Types: 3 Cans of beer per week     Comment: Weekends   • Drug use: Never   • Sexual activity: None   Other Topics Concern   • None   Social History Narrative   • None     Social Determinants of Health     Financial Resource Strain: Not on file   Food Insecurity: Not on file   Transportation Needs: Not on file   Physical Activity: Not on file   Stress: Not on file   Social Connections: Not on file   Intimate Partner Violence: Not on file   Housing Stability: Not on file        Current Outpatient Medications:   •  acetaminophen (TYLENOL) 325 mg tablet, , Disp: , Rfl:   •  apixaban (Eliquis) 5 mg, Every 12 hours, Disp: , Rfl:   •  escitalopram (LEXAPRO) 10 mg tablet, Take 10 mg by mouth daily, Disp: , Rfl:   •  flecainide (TAMBOCOR) 100 mg tablet, Every 12 hours, Disp: , Rfl:   •  furosemide (LASIX) 20 mg tablet, every 24 hours,  Disp: , Rfl:   •  metoprolol succinate (TOPROL-XL) 25 mg 24 hr tablet, TAKE 1 TABLET BY MOUTH ONCE DAILY IN EVENING OR BEDTIME, Disp: , Rfl:   •  multivitamin (THERAGRAN) TABS, Take 1 tablet by mouth daily, Disp: , Rfl:   •  mupirocin (BACTROBAN) 2 % ointment, Apply topically every other day With alginate dressing, Disp: 22 g, Rfl: 0  •  timolol (TIMOPTIC) 0.5 % ophthalmic solution, every 24 hours, Disp: , Rfl:   •  mupirocin (BACTROBAN) 2 % ointment, Apply topically 3 (three) times a day With Vaseline on open wounds (Patient not taking: Reported on 8/15/2024), Disp: 30 g, Rfl: 3  •  triamcinolone (KENALOG) 0.1 % cream, Apply topically twice a day for up to 14 days to active areas as needed. May repeat course after taking 1 week break. Do not use on face, groin, armpits. (Patient not taking: Reported on 8/15/2024), Disp: 30 g, Rfl: 0  History reviewed. No pertinent family history.   Review of Systems   Constitutional: Negative.    HENT: Negative.     Eyes: Negative.    Respiratory: Negative.     Cardiovascular: Negative.    Gastrointestinal: Negative.    Endocrine: Negative.    Genitourinary: Negative.    Musculoskeletal:         Right foot hallux valgus deformity   Skin:  Positive for wound.        Ulceration interdigital right first interspace.   Allergic/Immunologic: Negative.    Neurological: Negative.    Hematological: Negative.    Psychiatric/Behavioral: Negative.           Objective:  /84 (BP Location: Left arm, Patient Position: Sitting, Cuff Size: Adult)   Pulse 55   Ht 6' (1.829 m) Comment: stated  Wt 78.5 kg (173 lb)   BMI 23.46 kg/m²     Physical Exam  Constitutional:       Appearance: Normal appearance.   HENT:      Head: Normocephalic.      Right Ear: Tympanic membrane normal.      Left Ear: Tympanic membrane normal.      Nose: No congestion.      Mouth/Throat:      Pharynx: No oropharyngeal exudate or posterior oropharyngeal erythema.   Eyes:      Conjunctiva/sclera: Conjunctivae normal.      " Pupils: Pupils are equal, round, and reactive to light.   Cardiovascular:      Rate and Rhythm: Normal rate and regular rhythm.      Pulses:           Dorsalis pedis pulses are 0 on the right side and 0 on the left side.        Posterior tibial pulses are 1+ on the right side and 1+ on the left side.   Pulmonary:      Effort: Pulmonary effort is normal.   Musculoskeletal:      Right foot: Decreased range of motion. Bunion present.   Feet:      Right foot:      Protective Sensation: 10 sites tested.        Skin integrity: Ulcer (See comments) present.      Left foot:      Protective Sensation: 10 sites tested.        Comments: Right foot: Full-thickness ulcerative breakdown on the lateral aspect of right great toe IPJ, full-thickness ulcer measures 1.0 x 1.5 x 0.2 cm.  Slight maceration with hyperkeratotic necrotic nature.  Overall slightly improved with better wound base color.  No signs of infection.  Skin:     General: Skin is warm and dry.      Capillary Refill: Capillary refill takes 2 to 3 seconds.      Coloration: Skin is not pale.      Findings: No bruising, erythema, lesion or rash.   Neurological:      Mental Status: He is alert.      Cranial Nerves: No cranial nerve deficit.      Sensory: No sensory deficit.      Motor: No weakness.      Gait: Gait normal.      Deep Tendon Reflexes: Reflexes normal.   Psychiatric:         Mood and Affect: Mood normal.         Behavior: Behavior normal.         Judgment: Judgment normal.         Wound 08/15/24 Other (comment) Toe D1, great Anterior;Right;Lateral (Active)       Debridement   Wound 08/15/24 Other (comment) Toe D1, great Anterior;Right;Lateral    Universal Protocol:  procedure performed by consultantConsent: Verbal consent obtained.  Risks and benefits: risks, benefits and alternatives were discussed  Consent given by: patient  Time out: Immediately prior to procedure a \"time out\" was called to verify the correct patient, procedure, equipment, support staff " "and site/side marked as required.  Patient understanding: patient states understanding of the procedure being performed  Patient identity confirmed: verbally with patient    Debridement Details  Performed by: physician  Debridement type: surgical  Level of debridement: subcutaneous tissue  Pain control: lidocaine 4%      Post-debridement measurements  Length (cm): 1  Width (cm): 1.5  Depth (cm): 0.2  Percent debrided: 100%  Surface Area (cm^2): 1.5  Area Debrided (cm^2): 1.5  Volume (cm^3): 0.3    Tissue and other material debrided: subcutaneous tissue  Devitalized tissue debrided: callus and necrotic debris  Instrument(s) utilized: blade  Technique utilized: excisionalBleeding: medium  Hemostasis obtained with: pressure  Procedural pain (0-10): 3  Post-procedural pain: 3   Response to treatment: procedure was tolerated well                 Wound Instructions:  Orders Placed This Encounter   Procedures   • Debridement Other (comment) (Interdigital) Anterior;Right;Lateral Toe D1, great     This order was created via procedure documentation           Roshan Briceño DPM      Portions of the record may have been created with voice recognition software. Occasional wrong word or \"sound a like\" substitutions may have occurred due to the inherent limitations of voice recognition software. Read the chart carefully and recognize, using context, where substitutions have occurred.    "

## 2024-09-19 ENCOUNTER — OFFICE VISIT (OUTPATIENT)
Dept: PODIATRY | Facility: CLINIC | Age: 74
End: 2024-09-19
Payer: COMMERCIAL

## 2024-09-19 VITALS
SYSTOLIC BLOOD PRESSURE: 111 MMHG | DIASTOLIC BLOOD PRESSURE: 64 MMHG | WEIGHT: 171 LBS | HEART RATE: 62 BPM | BODY MASS INDEX: 23.16 KG/M2 | HEIGHT: 72 IN

## 2024-09-19 DIAGNOSIS — B35.1 ONYCHOMYCOSIS: Primary | ICD-10-CM

## 2024-09-19 DIAGNOSIS — L84 CORNS AND CALLUS: ICD-10-CM

## 2024-09-19 DIAGNOSIS — I73.9 PERIPHERAL VASCULAR DISEASE, UNSPECIFIED (HCC): ICD-10-CM

## 2024-09-19 PROCEDURE — 11056 PARNG/CUTG B9 HYPRKR LES 2-4: CPT | Performed by: PODIATRIST

## 2024-09-19 PROCEDURE — 11721 DEBRIDE NAIL 6 OR MORE: CPT | Performed by: PODIATRIST

## 2024-09-19 NOTE — PROGRESS NOTES
PATIENT:  Deion Wu  1950    ASSESSMENT:  1. Onychomycosis        2. Corns and callus        3. Peripheral vascular disease, unspecified (HCC)              No orders of the defined types were placed in this encounter.       PLAN:  The patient was educated in proper foot wear.  Also discussed daily foot assessment and proper foot care.    The patient will follow up in 9-12 weeks for foot exam and care.      Procedures: 84717, 59158  All mycotic toenails were reduced and debrided in length, width, and girth using a nail nipper and dremel.  All non-dystrphic nails also trimmed.    All hyperkeratotic skin lesion(s) if present were sharply pared with a scalpel / forcep with no bleeding or evidence of ulceration.    Patient tolerated procedure(s) well without complications.    Procedures     HPI:  Deion Wu is a 74 y.o.year old male seen for at risk foot exam and care.      The patient complained of thick toenails and painful lesions.  The patient has class findings with PVD.  Overall patient is clinically unchanged from prior visit.  The patient denied any acute pedal disorder, redness, acute swelling, or recent injury.      The following portions of the patient's history were reviewed and updated as appropriate: allergies, current medications, past family history, past medical history, past social history, past surgical history and problem list.    REVIEW OF SYSTEMS:  GENERAL: No fever or chills  HEART: No chest pain, or palpitation  RESPIRATORY:  No acute SOB or cough  GI: No Nausea, vomit or diarrhea  NEUROLOGIC: No syncope or acute weakness    PHYSICAL EXAM:    /64 (BP Location: Left arm, Patient Position: Sitting, Cuff Size: Adult)   Pulse 62   Ht 6' (1.829 m) Comment: stated  Wt 77.6 kg (171 lb)   BMI 23.19 kg/m²     VASCULAR EXAM  Posterior tibial artery  +1 bilateral.    Dorsalis pedis artery absent bilateral.  The patient has class findings with skin atrophy, lack of digital hair,  and nail dystrophy.    There is no lower extremity edema bilaterally.    Venous stasis skin changes noted No BLE.    NEUROLOGIC EXAM  Sensation is intact to light touch.  Sensation is intact to 10gm monofilament.  No focal neurologic deficit.          DERMATOLOGIC EXAM:   No ulcer or cellulitis noted.  Texture, Tone and Turgor are diminished? Yes  The patient has dystrophic/hypertrophic toenails with yellow/white discoloration, onycholysis, and subungal debris.   Fungal odor and brittle nature noted.  Pain with palpation.  Periungual erythema noted.  Right foot nails severely dystrophic x1 with 0.7 cm ave thickness (hallux)  Left foot nails dystrophic x5 with 0.4 cm ave thickness (1 through 5)  Patient has hyperkeratotic lesions noted:   Right foot at lateral hallux IPJ and medial 2nd PIPJ within 1st interspace.  Left foot at tip #3 toe  No notable suspicious skin lesions.      MUSCULOSKELETAL EXAM:   No acute joint pain, edema, or redness.  No acute musculoskeletal problem.    Patient has deformity including digital contractures.       Risk Category/Class Findings: Q8 (B2 ABC, B3)    A1)  Has the patient had a previous amputation of the foot or integral skeletal portion thereof? No  B1)  Does the patient have absent posterior tibial pulse? No  B3)  Does the patient have absent dorsalis pedis? Yes  B2)  Does the patient have three of the following? Yes           1.  Hair growth (increased or decreased), 2.  Nail changes (thickening) and 3.  Pigmentary changes (discoloring)  C)  Does the patient have two of the following and one above? No

## 2024-12-05 ENCOUNTER — OFFICE VISIT (OUTPATIENT)
Dept: PODIATRY | Facility: CLINIC | Age: 74
End: 2024-12-05
Payer: COMMERCIAL

## 2024-12-05 VITALS
HEIGHT: 72 IN | BODY MASS INDEX: 23.57 KG/M2 | WEIGHT: 174 LBS | HEART RATE: 75 BPM | SYSTOLIC BLOOD PRESSURE: 119 MMHG | DIASTOLIC BLOOD PRESSURE: 77 MMHG

## 2024-12-05 DIAGNOSIS — B35.1 ONYCHOMYCOSIS: Primary | ICD-10-CM

## 2024-12-05 DIAGNOSIS — L84 CORNS AND CALLUS: ICD-10-CM

## 2024-12-05 DIAGNOSIS — I73.9 PERIPHERAL VASCULAR DISEASE, UNSPECIFIED (HCC): ICD-10-CM

## 2024-12-05 PROCEDURE — 11721 DEBRIDE NAIL 6 OR MORE: CPT | Performed by: PODIATRIST

## 2024-12-05 PROCEDURE — 11056 PARNG/CUTG B9 HYPRKR LES 2-4: CPT | Performed by: PODIATRIST

## 2024-12-05 NOTE — PROGRESS NOTES
PATIENT:  Deion Wu  1950    ASSESSMENT:  1. Onychomycosis        2. Corns and callus        3. Peripheral vascular disease, unspecified (HCC)              No orders of the defined types were placed in this encounter.       PLAN:  The patient was educated in proper foot wear.    Also discussed daily foot assessment and proper foot care.    The patient will follow up in 9-12 weeks for foot exam and care.      Procedures: 68458, 75457  All mycotic toenails were reduced and debrided in length, width, and girth using a nail nipper and dremel.  All non-dystrphic nails also trimmed.    All hyperkeratotic skin lesion(s) if present were sharply pared with a scalpel / forcep with no bleeding or evidence of ulceration.    Patient tolerated procedure(s) well without complications.    Procedures     HPI:  Deion Wu is a 74 y.o.year old male seen for at risk foot exam and care.   The patient complained of thick toenails and painful lesions.  The patient has class findings with PVD.  Overall patient is clinically unchanged from prior visit.  The patient denied any acute pedal disorder, redness, acute swelling, or recent injury.      The following portions of the patient's history were reviewed and updated as appropriate: allergies, current medications, past family history, past medical history, past social history, past surgical history and problem list.    REVIEW OF SYSTEMS:  GENERAL: No fever or chills  HEART: No chest pain, or palpitation  RESPIRATORY:  No acute SOB or cough  GI: No Nausea, vomit or diarrhea  NEUROLOGIC: No syncope or acute weakness    PHYSICAL EXAM:    /77 (BP Location: Left arm, Patient Position: Sitting, Cuff Size: Adult)   Pulse 75   Ht 6' (1.829 m)   Wt 78.9 kg (174 lb)   BMI 23.60 kg/m²     VASCULAR EXAM  Posterior tibial artery  +1 bilateral.    Dorsalis pedis artery absent bilateral.  The patient has class findings with skin atrophy, lack of digital hair, and nail  dystrophy.    There is no lower extremity edema bilaterally.    Venous stasis skin changes noted No BLE.    NEUROLOGIC EXAM  Sensation is intact to light touch.  Sensation is intact to 10gm monofilament.  No focal neurologic deficit.          DERMATOLOGIC EXAM:   No ulcer or cellulitis noted.  Texture, Tone and Turgor are diminished? Yes  The patient has dystrophic/hypertrophic toenails with yellow/white discoloration, onycholysis, and subungal debris.   Fungal odor and brittle nature noted.  Pain with palpation.  Periungual erythema noted.  Right foot nails severely dystrophic x1 with 0.7 cm ave thickness (hallux)  Left foot nails dystrophic x5 with 0.4 cm ave thickness (1 through 5)  Patient has hyperkeratotic lesions noted:   Right foot at lateral hallux IPJ and medial 2nd PIPJ within 1st interspace.  Left foot at tip #3 toe  No notable suspicious skin lesions.      MUSCULOSKELETAL EXAM:   No acute joint pain, edema, or redness.  No acute musculoskeletal problem.    Patient has deformity including digital contractures.       Risk Category/Class Findings: Q8 (B2 ABC, B3)    A1)  Has the patient had a previous amputation of the foot or integral skeletal portion thereof? No  B1)  Does the patient have absent posterior tibial pulse? No  B3)  Does the patient have absent dorsalis pedis? Yes  B2)  Does the patient have three of the following? Yes           1.  Hair growth (increased or decreased), 2.  Nail changes (thickening) and 3.  Pigmentary changes (discoloring)  C)  Does the patient have two of the following and one above? No

## 2025-02-11 NOTE — PROGRESS NOTES
Faxed papers to [facility]    Jane Foley Note     Today's date: 2023  Patient name: Urmila Solitario  : 1950  MRN: [id number]  Referring provider: Prescilla Skiff, DO  Dx:   Encounter Diagnosis     ICD-10-CM    1. Weakness of left upper extremity  R29.898                      Subjective: I am doing ok      Objective: See treatment diary below      Assessment: Tolerated treatment fair. Patient  remains limited with L elbow flexion      Plan: Continue per plan of care. Precautions:cervical laminectomy c3-6 8/15/23    [email or web address]  Access Code: [email or web address]        Manuals   IE 11/15 11/21 11/28                                                             HEP 3x day   Tband                                                                                                     Ther Ex             PROM L UE 5m 5m 2m          Wrist ext /rd 1#  3x10 #1 3x110 2#  3x10 2#  3x10         Hammer sup Sm  3x10 SM 3x10 Sm  3x10 Sm  3x10         Dowel shld flex 3x10 Bar  3  x10           Shoulder flex 0 gravity 3x10 3x10 3x10 3x10         Elbow flex 0 gracity 3x10 #1 cuff 3x10 3x10 3x10         gripping  RPW 2x30 RPW  2x30 RPW  2x30         UBE 3/3   5/5 3/3         Ther Activity             Pegs  30    30x          Stress load    2m 2m         Webslide row   Green  3x10 Black  3x10         Webslide elbow ext shl ext    Black  3x10                      Modalities

## 2025-02-13 ENCOUNTER — OFFICE VISIT (OUTPATIENT)
Dept: PODIATRY | Facility: CLINIC | Age: 75
End: 2025-02-13
Payer: COMMERCIAL

## 2025-02-13 VITALS — BODY MASS INDEX: 23.84 KG/M2 | WEIGHT: 176 LBS | HEIGHT: 72 IN

## 2025-02-13 DIAGNOSIS — I73.9 PERIPHERAL VASCULAR DISEASE, UNSPECIFIED (HCC): ICD-10-CM

## 2025-02-13 DIAGNOSIS — L97.512 SKIN ULCER OF GREAT TOE, RIGHT, WITH FAT LAYER EXPOSED (HCC): ICD-10-CM

## 2025-02-13 DIAGNOSIS — B35.1 ONYCHOMYCOSIS: Primary | ICD-10-CM

## 2025-02-13 DIAGNOSIS — L84 CORNS AND CALLUS: ICD-10-CM

## 2025-02-13 PROCEDURE — RECHECK: Performed by: PODIATRIST

## 2025-02-13 PROCEDURE — 11056 PARNG/CUTG B9 HYPRKR LES 2-4: CPT | Performed by: PODIATRIST

## 2025-02-13 PROCEDURE — 11042 DBRDMT SUBQ TIS 1ST 20SQCM/<: CPT | Performed by: PODIATRIST

## 2025-02-13 PROCEDURE — 11721 DEBRIDE NAIL 6 OR MORE: CPT | Performed by: PODIATRIST

## 2025-02-13 RX ORDER — MUPIROCIN 20 MG/G
OINTMENT TOPICAL EVERY OTHER DAY
Qty: 22 G | Refills: 1 | Status: SHIPPED | OUTPATIENT
Start: 2025-02-13

## 2025-02-13 NOTE — PROGRESS NOTES
"  PATIENT:  Deion Wu  1950    ASSESSMENT:  1. Onychomycosis        2. Corns and callus        3. Peripheral vascular disease, unspecified (HCC)        4. Skin ulcer of great toe, right, with fat layer exposed (HCC)  Debridement Other (comment) (Interdigital) Anterior;Right;Lateral Toe D1, great    mupirocin (BACTROBAN) 2 % ointment    Post Op Shoe              Orders Placed This Encounter   Procedures   • Debridement Other (comment) (Interdigital) Anterior;Right;Lateral Toe D1, great   • Post Op Shoe        PLAN:  The patient was educated in proper foot wear.    Also discussed daily foot assessment and proper foot care.  .    Dry sterile dressing with antibiotic ointment, alginate, gauze, and Coban applied to right great toe.  Rx postop shoe to minimize interdigital pressure between the first and second toes.  Excisional debridement of ulceration full-thickness with 10 blade and tissue nipper as noted below  Follow-up in 2 weeks and 3 months  Rx mupirocin ointment to be applied every other day with alginate as directed to [facility].  Specific instructions on how to bandage right great toe dispensed to patient.    Procedures: 00020, [id number]  All mycotic toenails were reduced and debrided in length, width, and girth using a nail nipper and dremel.  All non-dystrphic nails also trimmed.    All hyperkeratotic skin lesion(s) if present were sharply pared with a scalpel / forcep with no bleeding or evidence of ulceration.    Patient tolerated procedure(s) well without complications.    Debridement   Wound 08/15/24 Other (comment) Toe D1, great Anterior;Right;Lateral    Universal Protocol:  procedure performed by consultantConsent: Verbal consent obtained.  Risks and benefits: risks, benefits and alternatives were discussed  Consent given by: patient  Time out: Immediately prior to procedure a \"time out\" was called to verify the correct patient, procedure, equipment, support staff and site/side marked as " required.  Patient understanding: patient states understanding of the procedure being performed  Patient identity confirmed: verbally with patient    Debridement Details  Performed by: physician  Debridement type: surgical  Level of debridement: subcutaneous tissue  Pain control: none      Post-debridement measurements  Length (cm): 0.8  Width (cm): 1  Depth (cm): 0.3  Percent debrided: 100%  Surface Area (cm^2): 0.8  Area Debrided (cm^2): 0.8  Volume (cm^3): 0.24    Tissue and other material debrided: subcutaneous tissue  Devitalized tissue debrided: callus, necrotic debris and slough  Instrument(s) utilized: nippers and blade  Technique utilized: excisionalBleeding: small  Hemostasis obtained with: pressure  Procedural pain (0-10): 3  Post-procedural pain: 3   Response to treatment: procedure was tolerated well         HPI:  Deion Wu is a 74 y.o.year old male seen for at risk foot exam and care.   The patient complained of thick toenails and painful lesions.  Patient is also concerned with painful irregular breakdown of inside edge of great toe.  Concerned with discoloration of the callus.  The patient has class findings with PVD.  Overall patient is clinically unchanged from prior visit.  The patient denied any acute pedal disorder, redness, acute swelling, or recent injury.      The following portions of the patient's history were reviewed and updated as appropriate: allergies, current medications, past family history, past medical history, past social history, past surgical history and problem list.    REVIEW OF SYSTEMS:  GENERAL: No fever or chills  HEART: No chest pain, or palpitation  RESPIRATORY:  No acute SOB or cough  GI: No Nausea, vomit or diarrhea  NEUROLOGIC: No syncope or acute weakness    PHYSICAL EXAM:    Ht 6' (1.829 m)   Wt 79.8 kg (176 lb)   BMI 23.87 kg/m²     VASCULAR EXAM  Posterior tibial artery  +1 bilateral.    Dorsalis pedis artery absent bilateral.  The patient has class  findings with skin atrophy, lack of digital hair, and nail dystrophy.    There is no lower extremity edema bilaterally.    Venous stasis skin changes noted No BLE.    NEUROLOGIC EXAM  Sensation is diminished to light touch.  Sensation is intact to 10gm monofilament.  No vibratory sensation appreciated.        DERMATOLOGIC EXAM:   No ulcer or cellulitis noted.  Texture, Tone and Turgor are diminished? Yes  The patient has dystrophic/hypertrophic toenails with yellow/white discoloration, onycholysis, and subungal debris.   Fungal odor and brittle nature noted.  Pain with palpation.  Periungual erythema noted.  Right foot nails severely dystrophic x1 with 0.7 cm ave thickness (hallux)  Left foot nails dystrophic x5 with 0.4 cm ave thickness (1 through 5)  Patient has hyperkeratotic lesions noted:   Right foot at Medial 2nd PIPJ within 1st interspace, and medial hallux IPJ.  Left foot at tip #3 toe, and medial hallux IPJ    Ulcerations/Wounds:  Full-thickness hyperkeratotic/necrotic ulcerative breakdown lateral aspect of right great toe lateral IPJ.  Ulceration within the first interspace with localized erythema.  Ulceration measures 0.8 x 1.0 x 0.3 cm post debridement.  Minimal serosanguineous drainage with mild maceration of margins.    MUSCULOSKELETAL EXAM:   No acute joint pain, edema, or redness.  No acute musculoskeletal problem.    Patient has deformity including digital contractures.       Risk Category/Class Findings: Q8 (B2 ABC, B3)    A1)  Has the patient had a previous amputation of the foot or integral skeletal portion thereof? No  B1)  Does the patient have absent posterior tibial pulse? No  B3)  Does the patient have absent dorsalis pedis? Yes  B2)  Does the patient have three of the following? Yes           1.  Hair growth (increased or decreased), 2.  Nail changes (thickening) and 3.  Pigmentary changes (discoloring)  C)  Does the patient have two of the following and one above? No

## 2025-02-27 ENCOUNTER — OFFICE VISIT (OUTPATIENT)
Dept: PODIATRY | Facility: CLINIC | Age: 75
End: 2025-02-27
Payer: COMMERCIAL

## 2025-02-27 VITALS — HEIGHT: 72 IN | BODY MASS INDEX: 24.24 KG/M2 | WEIGHT: 179 LBS

## 2025-02-27 DIAGNOSIS — L97.511: Primary | ICD-10-CM

## 2025-02-27 PROCEDURE — 97597 DBRDMT OPN WND 1ST 20 CM/<: CPT | Performed by: PODIATRIST

## 2025-02-27 PROCEDURE — RECHECK: Performed by: PODIATRIST

## 2025-02-27 NOTE — PROGRESS NOTES
Patient ID: Deion Wu is a 74 y.o. male Date of Birth 1950       Chief Complaint   Patient presents with   • Wound Check     Wound check right foot between toes        Allergies:  Patient has no known allergies.    Diagnosis:  1. Skin ulcer of great toe, right, limited to breakdown of skin (HCC)  -     Debridement Other (comment) (Interdigital) Anterior;Right;Lateral Toe D1, great     Diagnosis ICD-10-CM Associated Orders   1. Skin ulcer of great toe, right, limited to breakdown of skin (HCC)  L97.511 Debridement Other (comment) (Interdigital) Anterior;Right;Lateral Toe D1, great           Assessment & Plan:  Overall good progress as wound is 99.9% closed.  Continue with silver alginate dressing every other day.  Continue with silicone toe spacer.  Follow-up in 8 weeks.  Call immediately if any signs of recurring infection is noted    Subjective:   2/27/2025: 73-year-old male Seen today for follow-up skin ulceration right great toe.  Patient reports ulcer has diminished in size and it is nearly closed.  Overall pleased with progress.    2/13/2025:  74 y.o.year old male seen for at risk foot exam and care.   The patient complained of thick toenails and painful lesions.  Patient is also concerned with painful irregular breakdown of inside edge of great toe.  Concerned with discoloration of the callus.  The patient has class findings with PVD.  Overall patient is clinically unchanged from prior visit.  The patient denied any acute pedal disorder, redness, acute swelling, or recent injury.    8/30/2024: 73-year-old male seen today for follow-up evaluation of full-thickness ulceration right great toe.  Reports it does feel better and is no longer is tender.  Denies any fever or shortness of breath.    8/15/2024: 73 y.o.year old male seen for at risk foot exam and care.    Patient is concerned with thick toenails and painful lesions within first interspace right foot.  The patient has class findings with PVD.   Overall patient is clinically unchanged from prior visit except for new ulcerative breakdown with malodor right great toe.  Patient does not recall nor did he notice how long this has been present since he does not feel very well.  The patient denied any acute pedal disorder, redness, acute swelling, or recent injury.          The following portions of the patient's history were reviewed and updated as appropriate:   Patient Active Problem List   Diagnosis   • Skin ulcer of great toe, right, with fat layer exposed (HCC)     Past Medical History:   Diagnosis Date   • Atrial fibrillation (HCC)      Past Surgical History:   Procedure Laterality Date   • ADENOIDECTOMY     • APPENDECTOMY     • COLONOSCOPY     • TONSILECTOMY AND ADNOIDECTOMY     • TOTAL SHOULDER REPLACEMENT Bilateral L - ; R - 2016     Social History     Socioeconomic History   • Marital status: /Civil Union     Spouse name: None   • Number of children: None   • Years of education: None   • Highest education level: None   Occupational History   • None   Tobacco Use   • Smoking status: Former     Current packs/day: 0.00     Average packs/day: 0.5 packs/day for 10.0 years (5.0 ttl pk-yrs)     Types: Cigarettes, Pipe     Start date: 1972     Quit date: 1982     Years since quittin.5     Passive exposure: Past   • Smokeless tobacco: Never   Vaping Use   • Vaping status: Never Used   Substance and Sexual Activity   • Alcohol use: Yes     Alcohol/week: 3.0 standard drinks of alcohol     Types: 3 Cans of beer per week     Comment: 1 BEER WEEKLY   • Drug use: Never   • Sexual activity: None   Other Topics Concern   • None   Social History Narrative   • None     Social Drivers of Health     Financial Resource Strain: Not on file   Food Insecurity: Not on file   Transportation Needs: Not on file   Physical Activity: Not on file   Stress: Not on file   Social Connections: Not on file   Intimate Partner Violence: Not on file    Housing Stability: Not on file        Current Outpatient Medications:   •  apixaban (Eliquis) 5 mg, Every 12 hours, Disp: , Rfl:   •  escitalopram (LEXAPRO) 10 mg tablet, Take 10 mg by mouth daily, Disp: , Rfl:   •  flecainide (TAMBOCOR) 100 mg tablet, Every 12 hours, Disp: , Rfl:   •  furosemide (LASIX) 20 mg tablet, every 24 hours, Disp: , Rfl:   •  metoprolol succinate (TOPROL-XL) 25 mg 24 hr tablet, TAKE 1 TABLET BY MOUTH ONCE DAILY IN EVENING OR BEDTIME, Disp: , Rfl:   •  multivitamin (THERAGRAN) TABS, Take 1 tablet by mouth daily, Disp: , Rfl:   •  mupirocin (BACTROBAN) 2 % ointment, Apply topically every other day With alginate dressing, Disp: 22 g, Rfl: 1  •  timolol (TIMOPTIC) 0.5 % ophthalmic solution, every 24 hours (Patient not taking: Reported on 4/29/2025), Disp: , Rfl:   •  triamcinolone (KENALOG) 0.1 % cream, Apply topically twice a day for up to 14 days to active areas as needed. May repeat course after taking 1 week break. Do not use on face, groin, armpits., Disp: 30 g, Rfl: 0  •  acetaminophen (TYLENOL) 325 mg tablet, , Disp: , Rfl:   •  donepezil (ARICEPT) 5 mg tablet, Take 5 mg by mouth daily at bedtime, Disp: , Rfl:   •  mupirocin (BACTROBAN) 2 % ointment, Apply topically 3 (three) times a day With Vaseline on open wounds (Patient not taking: Reported on 2/13/2025), Disp: 30 g, Rfl: 3  History reviewed. No pertinent family history.   Review of Systems   Constitutional: Negative.    HENT: Negative.     Eyes: Negative.    Respiratory: Negative.     Cardiovascular: Negative.    Gastrointestinal: Negative.    Endocrine: Negative.    Genitourinary: Negative.    Musculoskeletal:         Right foot hallux valgus deformity   Skin:  Positive for wound.        Ulceration interdigital right first interspace.   Allergic/Immunologic: Negative.    Neurological: Negative.    Hematological: Negative.    Psychiatric/Behavioral: Negative.           Objective:  Ht 6' (1.829 m) Comment: stated  Wt 81.2 kg (179  lb)   BMI 24.28 kg/m²     Physical Exam  Constitutional:       Appearance: Normal appearance.   HENT:      Head: Normocephalic.      Right Ear: Tympanic membrane normal.      Left Ear: Tympanic membrane normal.      Nose: No congestion.      Mouth/Throat:      Pharynx: No oropharyngeal exudate or posterior oropharyngeal erythema.   Eyes:      Conjunctiva/sclera: Conjunctivae normal.      Pupils: Pupils are equal, round, and reactive to light.   Cardiovascular:      Rate and Rhythm: Normal rate and regular rhythm.      Pulses:           Dorsalis pedis pulses are 0 on the right side and 0 on the left side.        Posterior tibial pulses are 1+ on the right side and 1+ on the left side.   Pulmonary:      Effort: Pulmonary effort is normal.   Musculoskeletal:      Right foot: Decreased range of motion. Bunion present.   Feet:      Right foot:      Protective Sensation: 10 sites tested.        Skin integrity: Ulcer (See comments) present.      Left foot:      Protective Sensation: 10 sites tested.        Comments: Right foot: Left breakdown on the lateral aspect of right great toe IPJ, partial depth now measuring 0.1 x 0.1 x 0.1 cm.  No maceration, hyperkeratotic margins persist.  Overall improved since last visit dramatically.  No signs of infection.  Skin:     General: Skin is warm and dry.      Capillary Refill: Capillary refill takes 2 to 3 seconds.      Coloration: Skin is not pale.      Findings: No bruising, erythema, lesion or rash.   Neurological:      Mental Status: He is alert.      Cranial Nerves: No cranial nerve deficit.      Sensory: No sensory deficit.      Motor: No weakness.      Gait: Gait normal.      Deep Tendon Reflexes: Reflexes normal.   Psychiatric:         Mood and Affect: Mood normal.         Behavior: Behavior normal.         Judgment: Judgment normal.         Wound 08/15/24 Other (comment) Toe D1, great Anterior;Right;Lateral (Active)       Debridement   Wound 08/15/24 Other (comment) Toe  "D1, great Anterior;Right;Lateral     Date/Time: 2/27/2025 1:15 PM  Universal Protocol:  procedure performed by consultantConsent: Verbal consent obtained.  Risks and benefits: risks, benefits and alternatives were discussed  Consent given by: patient  Time out: Immediately prior to procedure a \"time out\" was called to verify the correct patient, procedure, equipment, support staff and site/side marked as required.  Patient understanding: patient states understanding of the procedure being performed  Patient identity confirmed: verbally with patient    Debridement Details  Performed by: physician  Debridement type: selective  Pain control: none    Post-debridement measurements  Length (cm): 0.1  Width (cm): 0.1  Depth (cm): 0.1  Percent debrided: 100%  Surface Area (cm^2): 0.01  Area Debrided (cm^2): 0.01  Volume (cm^3): 0    Devitalized tissue debrided: callus and slough  Instrument(s) utilized: blade  Bleeding: small  Hemostasis obtained with: pressure  Procedural pain (0-10): 2  Post-procedural pain: 2   Response to treatment: procedure was tolerated well                 Wound Instructions:  Orders Placed This Encounter   Procedures   • Debridement Other (comment) (Interdigital) Anterior;Right;Lateral Toe D1, great     This order was created via procedure documentation         Roshan Briceño DPM      Portions of the record may have been created with voice recognition software. Occasional wrong word or \"sound a like\" substitutions may have occurred due to the inherent limitations of voice recognition software. Read the chart carefully and recognize, using context, where substitutions have occurred.    "

## 2025-04-29 ENCOUNTER — PROCEDURE VISIT (OUTPATIENT)
Dept: PODIATRY | Facility: CLINIC | Age: 75
End: 2025-04-29
Payer: COMMERCIAL

## 2025-04-29 VITALS — HEIGHT: 72 IN | BODY MASS INDEX: 24.52 KG/M2 | WEIGHT: 181 LBS

## 2025-04-29 DIAGNOSIS — B35.1 ONYCHOMYCOSIS: ICD-10-CM

## 2025-04-29 DIAGNOSIS — I73.9 PERIPHERAL VASCULAR DISEASE, UNSPECIFIED (HCC): ICD-10-CM

## 2025-04-29 DIAGNOSIS — L84 CORNS AND CALLUS: ICD-10-CM

## 2025-04-29 DIAGNOSIS — L97.512 SKIN ULCER OF GREAT TOE, RIGHT, WITH FAT LAYER EXPOSED (HCC): Primary | ICD-10-CM

## 2025-04-29 PROCEDURE — 11042 DBRDMT SUBQ TIS 1ST 20SQCM/<: CPT | Performed by: PODIATRIST

## 2025-04-29 PROCEDURE — RECHECK: Performed by: PODIATRIST

## 2025-04-29 PROCEDURE — 11721 DEBRIDE NAIL 6 OR MORE: CPT | Performed by: PODIATRIST

## 2025-04-29 PROCEDURE — 11056 PARNG/CUTG B9 HYPRKR LES 2-4: CPT | Performed by: PODIATRIST

## 2025-04-29 RX ORDER — DONEPEZIL HYDROCHLORIDE 5 MG/1
5 TABLET, FILM COATED ORAL
COMMUNITY
Start: 2025-04-23

## 2025-05-08 NOTE — PROGRESS NOTES
Patient ID: Deion Wu is a 74 y.o. male Date of Birth 1950       Chief Complaint   Patient presents with   • Follow-up     Routine foot care        Allergies:  Patient has no known allergies.    Diagnosis:  1. Skin ulcer of great toe, right, with fat layer exposed (HCC)  -     Debridement Other (comment) (Interdigital) Anterior;Right;Lateral Toe D1, great  2. Onychomycosis  3. Corns and callus  4. Peripheral vascular disease, unspecified (HCC)     Diagnosis ICD-10-CM Associated Orders   1. Skin ulcer of great toe, right, with fat layer exposed (HCC)  L97.512 Debridement Other (comment) (Interdigital) Anterior;Right;Lateral Toe D1, great      2. Onychomycosis  B35.1       3. Corns and callus  L84       4. Peripheral vascular disease, unspecified ([facility])  I73.9            Assessment & Plan:  Recurrent ulceration of the lateral side of the right great toe.  Silver alginate dressing every other day right great toe lateral ulcer.  Utilize silicone spacer daily.  Call immediately if any signs of infection are noted.  Excisional debridement as noted below.  Debridement of mycotic nails x 10 as noted below.  Follow-up in 2 weeks.    Subjective:   4/29/2025: 74-year-old male seen today for follow-up recurrent ulceration within the first interspace right foot.  Reports he did not think it was doing too bad but subtle malodor has been noted.  Tenderness with palpation.    2/27/2025: 73-year-old male Seen today for follow-up skin ulceration right great toe.  Patient reports ulcer has diminished in size and it is nearly closed.  Overall pleased with progress.    2/13/2025:  74 y.o.year old male seen for at risk foot exam and care.   The patient complained of thick toenails and painful lesions.  Patient is also concerned with painful irregular breakdown of inside edge of great toe.  Concerned with discoloration of the callus.  The patient has class findings with PVD.  Overall patient is clinically unchanged from prior  visit.  The patient denied any acute pedal disorder, redness, acute swelling, or recent injury.    2024: 73-year-old male seen today for follow-up evaluation of full-thickness ulceration right great toe.  Reports it does feel better and is no longer is tender.  Denies any fever or shortness of breath.    8/15/2024: 73 y.o.year old male seen for at risk foot exam and care.    Patient is concerned with thick toenails and painful lesions within first interspace right foot.  The patient has class findings with PVD.  Overall patient is clinically unchanged from prior visit except for new ulcerative breakdown with malodor right great toe.  Patient does not recall nor did he notice how long this has been present since he does not feel very well.  The patient denied any acute pedal disorder, redness, acute swelling, or recent injury.          The following portions of the patient's history were reviewed and updated as appropriate:   Patient Active Problem List   Diagnosis   • Skin ulcer of great toe, right, with fat layer exposed (HCC)     Past Medical History:   Diagnosis Date   • Atrial fibrillation (HCC)      Past Surgical History:   Procedure Laterality Date   • ADENOIDECTOMY     • APPENDECTOMY     • COLONOSCOPY     • TONSILECTOMY AND ADNOIDECTOMY     • TOTAL SHOULDER REPLACEMENT Bilateral L - ; R - 2016     Social History     Socioeconomic History   • Marital status: /Civil Union     Spouse name: None   • Number of children: None   • Years of education: None   • Highest education level: None   Occupational History   • None   Tobacco Use   • Smoking status: Former     Current packs/day: 0.00     Average packs/day: 0.5 packs/day for 10.0 years (5.0 ttl pk-yrs)     Types: Cigarettes, Pipe     Start date: 1972     Quit date: 1982     Years since quittin.5     Passive exposure: Past   • Smokeless tobacco: Never   Vaping Use   • Vaping status: Never Used   Substance and Sexual  Activity   • Alcohol use: Yes     Alcohol/week: 3.0 standard drinks of alcohol     Types: 3 Cans of beer per week     Comment: 1 BEER WEEKLY   • Drug use: Never   • Sexual activity: None   Other Topics Concern   • None   Social History Narrative   • None     Social Drivers of Health     Financial Resource Strain: Not on file   Food Insecurity: Not on file   Transportation Needs: Not on file   Physical Activity: Not on file   Stress: Not on file   Social Connections: Not on file   Intimate Partner Violence: Not on file   Housing Stability: Not on file        Current Outpatient Medications:   •  apixaban (Eliquis) 5 mg, Every 12 hours, Disp: , Rfl:   •  donepezil (ARICEPT) 5 mg tablet, Take 5 mg by mouth daily at bedtime, Disp: , Rfl:   •  escitalopram (LEXAPRO) 10 mg tablet, Take 10 mg by mouth daily, Disp: , Rfl:   •  flecainide (TAMBOCOR) 100 mg tablet, Every 12 hours, Disp: , Rfl:   •  furosemide (LASIX) 20 mg tablet, every 24 hours, Disp: , Rfl:   •  metoprolol succinate (TOPROL-XL) 25 mg 24 hr tablet, TAKE 1 TABLET BY MOUTH ONCE DAILY IN EVENING OR BEDTIME, Disp: , Rfl:   •  multivitamin (THERAGRAN) TABS, Take 1 tablet by mouth daily, Disp: , Rfl:   •  mupirocin (BACTROBAN) 2 % ointment, Apply topically every other day With alginate dressing, Disp: 22 g, Rfl: 1  •  triamcinolone (KENALOG) 0.1 % cream, Apply topically twice a day for up to 14 days to active areas as needed. May repeat course after taking 1 week break. Do not use on face, groin, armpits., Disp: 30 g, Rfl: 0  •  acetaminophen (TYLENOL) 325 mg tablet, , Disp: , Rfl:   •  mupirocin (BACTROBAN) 2 % ointment, Apply topically 3 (three) times a day With Vaseline on open wounds (Patient not taking: Reported on 2/13/2025), Disp: 30 g, Rfl: 3  •  timolol (TIMOPTIC) 0.5 % ophthalmic solution, every 24 hours (Patient not taking: Reported on 4/29/2025), Disp: , Rfl:   No family history on file.   Review of Systems   Constitutional: Negative.    HENT: Negative.      Eyes: Negative.    Respiratory: Negative.     Cardiovascular: Negative.    Gastrointestinal: Negative.    Endocrine: Negative.    Genitourinary: Negative.    Musculoskeletal:         Right foot hallux valgus deformity   Skin:  Positive for wound.        Ulceration interdigital right first interspace.   Allergic/Immunologic: Negative.    Neurological: Negative.    Hematological: Negative.    Psychiatric/Behavioral: Negative.       Objective:  Ht 6' (1.829 m) Comment: Verbal  Wt 82.1 kg (181 lb)   BMI 24.55 kg/m²     Physical Exam  Constitutional:       Appearance: Normal appearance.   HENT:      Head: Normocephalic.      Right Ear: Tympanic membrane normal.      Left Ear: Tympanic membrane normal.      Nose: No congestion.      Mouth/Throat:      Pharynx: No oropharyngeal exudate or posterior oropharyngeal erythema.   Eyes:      Conjunctiva/sclera: Conjunctivae normal.      Pupils: Pupils are equal, round, and reactive to light.   Cardiovascular:      Rate and Rhythm: Normal rate and regular rhythm.      Pulses:           Dorsalis pedis pulses are 0 on the right side and 0 on the left side.        Posterior tibial pulses are 1+ on the right side and 1+ on the left side.   Pulmonary:      Effort: Pulmonary effort is normal.   Musculoskeletal:         General: Tenderness present. No deformity.      Right foot: Decreased range of motion. Bunion present.      Comments: Right foot moderate hallux valgus deformity with subtly contracted second toe.  The combination of these 2 things caused excessive friction and pressure between the 1st and 2nd toe which led to breakdown and ulceration.   Feet:      Right foot:      Protective Sensation: 10 sites tested.        Skin integrity: Ulcer (See comments) and callus (Medial #2 PIPJ) present.      Toenail Condition: Right toenails are abnormally thick. Fungal disease present.     Left foot:      Protective Sensation: 10 sites tested.        Skin integrity: Callus (Tip of left  "#3 toe) present.      Toenail Condition: Left toenails are abnormally thick. Fungal disease present.     Comments: Right foot: Full-thickness breakdown lateral aspect of right great toe IPJ, measuring 0.5 x 0.5 x 0.2 cm post debridement.  Subtle maceration, hyperkeratotic margins persist.  Fibrous base.  Overall wound has degraded since last evaluation in February.  No signs of infection.  Skin:     General: Skin is warm and dry.      Capillary Refill: Capillary refill takes 2 to 3 seconds.      Coloration: Skin is not pale.      Findings: No bruising, erythema, lesion or rash.      Comments:   Else: Severely dystrophic nails x 10 consistent with mycosis.  0.5 cm average thickness.  Brittle nature subungual debris and yellow discoloration.   Neurological:      Mental Status: He is alert.      Cranial Nerves: No cranial nerve deficit.      Sensory: No sensory deficit.      Motor: No weakness.      Gait: Gait normal.      Deep Tendon Reflexes: Reflexes normal.   Psychiatric:         Mood and Affect: Mood normal.         Behavior: Behavior normal.         Judgment: Judgment normal.         Wound 08/15/24 Other (comment) Toe D1, great Anterior;Right;Lateral (Active)       Debridement   Wound 08/15/24 Other (comment) Toe D1, great Anterior;Right;Lateral     Date/Time: 4/29/2025 3:15 PM    Universal Protocol:  procedure performed by consultantConsent: Verbal consent obtained.  Risks and benefits: risks, benefits and alternatives were discussed  Consent given by: patient  Time out: Immediately prior to procedure a \"time out\" was called to verify the correct patient, procedure, equipment, support staff and site/side marked as required.  Patient understanding: patient states understanding of the procedure being performed  Patient identity confirmed: verbally with patient    Debridement Details  Performed by: physician  Debridement type: surgical  Level of debridement: subcutaneous tissue  Pain control: " "none    Post-debridement measurements  Length (cm): 0.5  Width (cm): 0.5  Depth (cm): 0.2  Percent debrided: 100%  Surface Area (cm^2): 0.25  Area Debrided (cm^2): 0.25  Volume (cm^3): 0.05    Tissue and other material debrided: subcutaneous tissue  Devitalized tissue debrided: callus, necrotic debris and slough  Instrument(s) utilized: nippers, blade and curette  Technique utilized: excisional  Bleeding: small  Hemostasis obtained with: pressure  Procedural pain (0-10): 3  Post-procedural pain: 3   Response to treatment: procedure was tolerated well        Procedure: 36168, 34905  All mycotic toenails were reduced and debrided in length, width, and girth using a nail nipper and electric dremel.    All hyperkeratotic skin lesion(s) were sharply pared with a scalpel #10 blade with no evidence of ulceration beneath.    Patient tolerated procedure(s) well without complications.      Risk Category/Class Findings: Q8(B2abc,B3)    A1)  Has the patient had a previous amputation of the foot or integral skeletal portion thereof? No  B1)  Does the patient have absent posterior tibial pulse? No  B3)  Does the patient have absent dorsalis pedis? Yes  B2)  Does the patient have three of the following? Yes           1.  Hair growth (increased or decreased), 2.  Nail changes (thickening), and 3.  Pigmentary changes (discoloring)  C)  Does the patient have two of the following and one above? No                    Wound Instructions:  Orders Placed This Encounter   Procedures   • Debridement Other (comment) (Interdigital) Anterior;Right;Lateral Toe D1, great     This order was created via procedure documentation           Roshan Briceño DPM      Portions of the record may have been created with voice recognition software. Occasional wrong word or \"sound a like\" substitutions may have occurred due to the inherent limitations of voice recognition software. Read the chart carefully and recognize, using context, where substitutions " have occurred.

## 2025-05-13 ENCOUNTER — OFFICE VISIT (OUTPATIENT)
Dept: PODIATRY | Facility: CLINIC | Age: 75
End: 2025-05-13
Payer: COMMERCIAL

## 2025-05-13 VITALS — BODY MASS INDEX: 23.7 KG/M2 | WEIGHT: 175 LBS | HEIGHT: 72 IN

## 2025-05-13 DIAGNOSIS — I73.9 PERIPHERAL VASCULAR DISEASE, UNSPECIFIED (HCC): ICD-10-CM

## 2025-05-13 DIAGNOSIS — L97.512 SKIN ULCER OF GREAT TOE, RIGHT, WITH FAT LAYER EXPOSED (HCC): Primary | ICD-10-CM

## 2025-05-13 DIAGNOSIS — M20.11 HALLUX VALGUS OF RIGHT FOOT: ICD-10-CM

## 2025-05-13 PROCEDURE — 99213 OFFICE O/P EST LOW 20 MIN: CPT | Performed by: PODIATRIST

## 2025-05-14 NOTE — ASSESSMENT & PLAN NOTE
Excellent progress with complete closure right great toe lateral IPJ ulceration.  Continue with silicone interdigital spacer daily  Monitor closely for recurrence of infection  Follow-up in approximately 6 weeks.

## 2025-05-14 NOTE — PROGRESS NOTES
:  Assessment & Plan  Skin ulcer of great toe, right, with fat layer exposed (HCC)  Excellent progress with complete closure right great toe lateral IPJ ulceration.  Continue with silicone interdigital spacer daily  Monitor closely for recurrence of infection  Follow-up in approximately 6 weeks.       Peripheral vascular disease, unspecified (HCC)  As documented previously with absent pedal pulses       Hallux valgus of right foot  Underlying clinical deformity that contributes significantly to the ulceration breakdown along the lateral aspect of the hallux IPJ secondary to the second toe rubbing excessively against the great toe.  This is all secondary to           History of Present Illness     Deion Wu is a 74 y.o. male   5/13/2025: 74-year-old male seen today for follow-up evaluation of chronic ulcer within the first interspace.  Reports good progress with no drainage over the past couple days.    4/29/2025: 74-year-old male seen today for follow-up recurrent ulceration within the first interspace right foot.  Reports he did not think it was doing too bad but subtle malodor has been noted.  Tenderness with palpation.    2/27/2025: 73-year-old male Seen today for follow-up skin ulceration right great toe.  Patient reports ulcer has diminished in size and it is nearly closed.  Overall pleased with progress.    2/13/2025:  74 y.o.year old male seen for at risk foot exam and care.   The patient complained of thick toenails and painful lesions.  Patient is also concerned with painful irregular breakdown of inside edge of great toe.  Concerned with discoloration of the callus.  The patient has class findings with PVD.  Overall patient is clinically unchanged from prior visit.  The patient denied any acute pedal disorder, redness, acute swelling, or recent injury.    8/30/2024: 73-year-old male seen today for follow-up evaluation of full-thickness ulceration right great toe.  Reports it does feel better and is  no longer is tender.  Denies any fever or shortness of breath.    8/15/2024: 73 y.o.year old male seen for at risk foot exam and care.    Patient is concerned with thick toenails and painful lesions within first interspace right foot.  The patient has class findings with PVD.  Overall patient is clinically unchanged from prior visit except for new ulcerative breakdown with malodor right great toe.  Patient does not recall nor did he notice how long this has been present since he does not feel very well.  The patient denied any acute pedal disorder, redness, acute swelling, or recent injury.        Review of Systems   Constitutional: Negative.    HENT: Negative.     Eyes: Negative.    Respiratory: Negative.     Cardiovascular:  Positive for palpitations.   Gastrointestinal: Negative.    Endocrine: Negative.    Genitourinary: Negative.    Musculoskeletal: Negative.    Skin:         Interdigital ulcer right foot great toe   Allergic/Immunologic: Negative.    Neurological: Negative.    Hematological: Negative.    Psychiatric/Behavioral: Negative.       Objective   Ht 6' (1.829 m) Comment: stated  Wt 79.4 kg (175 lb)   BMI 23.73 kg/m²      Physical Exam  Vitals reviewed.   Constitutional:       General: He is not in acute distress.     Appearance: He is well-developed.   HENT:      Head: Normocephalic and atraumatic.      Nose: Nose normal.   Eyes:      Conjunctiva/sclera: Conjunctivae normal.   Cardiovascular:      Rate and Rhythm: Normal rate and regular rhythm.      Pulses: Normal pulses.   Pulmonary:      Effort: Pulmonary effort is normal.   Musculoskeletal:         General: No deformity (Hallux valgus right).      Cervical back: Neck supple.   Feet:      Right foot:      Protective Sensation: 10 sites tested.  10 sites sensed.      Left foot:      Protective Sensation: 10 sites tested.  10 sites sensed.   Skin:     General: Skin is warm and dry.      Capillary Refill: Capillary refill takes 2 to 3 seconds.       Comments: Full-thickness ulcer breakdown lateral aspect of the right hallux IPJ has closed completely with good stable eschar.  No signs of infection.   Neurological:      General: No focal deficit present.      Mental Status: He is alert and oriented to person, place, and time.   Psychiatric:         Mood and Affect: Mood normal.

## 2025-05-16 ENCOUNTER — HOSPITAL ENCOUNTER (OUTPATIENT)
Dept: MRI IMAGING | Facility: HOSPITAL | Age: 75
End: 2025-05-16
Attending: SPECIALIST
Payer: COMMERCIAL

## 2025-05-16 DIAGNOSIS — G30.1 DEMENTIA OF THE ALZHEIMER'S TYPE, WITH LATE ONSET, WITH DELIRIUM (HCC): ICD-10-CM

## 2025-05-16 DIAGNOSIS — F02.80 DEMENTIA OF THE ALZHEIMER'S TYPE, WITH LATE ONSET, WITH DELIRIUM (HCC): ICD-10-CM

## 2025-05-16 DIAGNOSIS — F05 DEMENTIA OF THE ALZHEIMER'S TYPE, WITH LATE ONSET, WITH DELIRIUM (HCC): ICD-10-CM

## 2025-05-16 PROCEDURE — 70551 MRI BRAIN STEM W/O DYE: CPT

## 2025-07-08 ENCOUNTER — PROCEDURE VISIT (OUTPATIENT)
Dept: PODIATRY | Facility: CLINIC | Age: 75
End: 2025-07-08
Payer: COMMERCIAL

## 2025-07-08 VITALS — WEIGHT: 177 LBS | HEIGHT: 72 IN | BODY MASS INDEX: 23.98 KG/M2

## 2025-07-08 DIAGNOSIS — L84 CORNS AND CALLUS: ICD-10-CM

## 2025-07-08 DIAGNOSIS — B35.1 ONYCHOMYCOSIS: Primary | ICD-10-CM

## 2025-07-08 DIAGNOSIS — L97.511: ICD-10-CM

## 2025-07-08 DIAGNOSIS — I73.9 PERIPHERAL VASCULAR DISEASE, UNSPECIFIED (HCC): ICD-10-CM

## 2025-07-08 PROCEDURE — 99212 OFFICE O/P EST SF 10 MIN: CPT | Performed by: PODIATRIST

## 2025-07-08 PROCEDURE — 11721 DEBRIDE NAIL 6 OR MORE: CPT | Performed by: PODIATRIST

## 2025-07-08 PROCEDURE — 11056 PARNG/CUTG B9 HYPRKR LES 2-4: CPT | Performed by: PODIATRIST

## 2025-07-14 NOTE — PROGRESS NOTES
PATIENT:  Deion Wu  1950    Assessment & Plan  Onychomycosis  Corns and callus  Peripheral vascular disease, unspecified (HCC)  The patient was educated in proper foot wear.    Also discussed daily foot assessment and proper foot care.  .    Follow-up in 3 months  Debridement of mycotic nails as noted below  Paring benign hyperkeratotic lesion as noted below  Meets class finding requirements for routine footcare Q8       Skin ulcer of great toe, right, limited to breakdown of skin (HCC)  Good progress with complete closure of ulceration since last visit.  Monitor lateral side of right great toe closely for recurrence of ulceration as lesion starts to build back up again.  Recommend continued use of silicone toe spacer to help offload this area and prevent recurrence.  Call immediately if any signs of redness swelling or infection are noted.  May consider surgical intervention to reduce the prominent bone structure that contributes to the recurrence.          Procedures: 47459, 74923  All mycotic toenails were reduced and debrided in length, width, and girth using a nail nipper and dremel.  All non-dystrphic nails also trimmed.    All hyperkeratotic skin lesion(s) if present were sharply pared with a scalpel / forcep with no bleeding or evidence of ulceration.    Patient tolerated procedure(s) well without complications.    Procedures     HPI:  Deion Wu is a 74 y.o.year old male seen for at risk foot exam and care.   The patient complained of thick toenails and painful lesions.  Patient is also concerned with painful irregular breakdown of inside edge of great toe.  Concerned with discoloration of the callus.  The patient has class findings with PVD.  Overall patient is clinically unchanged from prior visit.  The patient denied any acute pedal disorder, redness, acute swelling, or recent injury.      The following portions of the patient's history were reviewed and updated as appropriate:  allergies, current medications, past family history, past medical history, past social history, past surgical history and problem list.    REVIEW OF SYSTEMS:  GENERAL: No fever or chills  HEART: No chest pain, or palpitation  RESPIRATORY:  No acute SOB or cough  GI: No Nausea, vomit or diarrhea  NEUROLOGIC: No syncope or acute weakness    PHYSICAL EXAM:    Ht 6' (1.829 m) Comment: stated  Wt 80.3 kg (177 lb)   BMI 24.01 kg/m²     VASCULAR EXAM  Posterior tibial artery  +1 bilateral.    Dorsalis pedis artery absent bilateral.  The patient has class findings with skin atrophy, lack of digital hair, and nail dystrophy.    There is no lower extremity edema bilaterally.    Venous stasis skin changes noted No BLE.    NEUROLOGIC EXAM  Sensation is diminished to light touch.  Sensation is intact to 10gm monofilament.  No vibratory sensation appreciated.        DERMATOLOGIC EXAM:   No ulcer or cellulitis noted.  Texture, Tone and Turgor are diminished? Yes  The patient has dystrophic/hypertrophic toenails with yellow/white discoloration, onycholysis, and subungal debris.   Fungal odor and brittle nature noted.  Pain with palpation.  Periungual erythema noted.  Right foot nails severely dystrophic x1 with 0.7 cm ave thickness (hallux)  Left foot nails dystrophic x5 with 0.4 cm ave thickness (1 through 5)  Patient has hyperkeratotic lesions noted:   Right foot at Medial 2nd PIPJ within 1st interspace, and Medial hallux IPJ.  Left foot at tip #3 toe, and medial hallux IPJ    Ulcerations/Wounds:  Right great toe: Full-thickness hyperkeratotic/necrotic ulcerative breakdown lateral aspect of right great toe has completely closed.  Dry with hyperkeratotic/porokeratotic lesion starting to recur, no signs of infection.    MUSCULOSKELETAL EXAM:   No acute joint pain, edema, or redness.    No acute musculoskeletal problem.    Patient has deformity including digital contractures.   Prominent lateral hallux IPJ.  Palpable osseous  prominence noted.    Risk Category/Class Findings: Q8 (B2 ABC, B3)  A1)  Has the patient had a previous amputation of the foot or integral skeletal portion thereof? No  B1)  Does the patient have absent posterior tibial pulse? No  B3)  Does the patient have absent dorsalis pedis? Yes  B2)  Does the patient have three of the following? Yes           1.  Hair growth (increased or decreased), 2.  Nail changes (thickening) and 3.  Pigmentary changes (discoloring)  C)  Does the patient have two of the following and one above? No